# Patient Record
Sex: MALE | Race: WHITE | NOT HISPANIC OR LATINO | Employment: FULL TIME | URBAN - METROPOLITAN AREA
[De-identification: names, ages, dates, MRNs, and addresses within clinical notes are randomized per-mention and may not be internally consistent; named-entity substitution may affect disease eponyms.]

---

## 2021-04-07 ENCOUNTER — TRANSFERRED RECORDS (OUTPATIENT)
Dept: HEALTH INFORMATION MANAGEMENT | Facility: CLINIC | Age: 66
End: 2021-04-07

## 2021-04-07 ENCOUNTER — TRANSCRIBE ORDERS (OUTPATIENT)
Dept: OTHER | Age: 66
End: 2021-04-07

## 2021-04-07 ENCOUNTER — MEDICAL CORRESPONDENCE (OUTPATIENT)
Dept: HEALTH INFORMATION MANAGEMENT | Facility: CLINIC | Age: 66
End: 2021-04-07

## 2021-04-07 DIAGNOSIS — N40.1 BENIGN PROSTATIC HYPERPLASIA WITH LOWER URINARY TRACT SYMPTOMS: Primary | ICD-10-CM

## 2021-04-08 NOTE — TELEPHONE ENCOUNTER
Action Luz 4/8/2021    Action Taken [facility] faxed to [facility] to obtain recs  FAX: [phone]     Action Luz 4/15/2021   Action Taken [place] recs scanned in epic to review

## 2021-08-03 ENCOUNTER — VIRTUAL VISIT (OUTPATIENT)
Dept: UROLOGY | Facility: CLINIC | Age: 66
End: 2021-08-03
Attending: UROLOGY
Payer: COMMERCIAL

## 2021-08-03 ENCOUNTER — PRE VISIT (OUTPATIENT)
Dept: UROLOGY | Facility: CLINIC | Age: 66
End: 2021-08-03

## 2021-08-03 DIAGNOSIS — Z53.9 NO SHOW: Primary | ICD-10-CM

## 2021-08-03 PROCEDURE — 99207 PR NO BILLABLE SERVICE THIS VISIT: CPT | Performed by: UROLOGY

## 2021-08-03 NOTE — PROGRESS NOTES
This patient was a no show for this scheduled appointment.  Multiple attempts made to reach patient, links sent to join video appointment, checked on virtual waiting room for patient.     - Shannon HONG, EMT  Urology Clinic    This patient was a no show for this scheduled appointment.

## 2021-08-03 NOTE — LETTER
8/3/2021       RE: Billy Velásquez  [address] [facility]  [place] [address]     Dear Colleague,    Thank you for referring your patient, Billy Velásquez, to the [facility] UROLOGY CLINIC [place] at [facility]. Please see a copy of my visit note below.      This patient was a no show for this scheduled appointment.  Multiple attempts made to reach patient, links sent to join video appointment, checked on virtual waiting room for patient.     - Shannon HONG, TULIO  Urology Clinic    This patient was a no show for this scheduled appointment.      Again, thank you for allowing me to participate in the care of your patient.      Sincerely,    Francisco Javier Jhaveri MD

## 2021-08-03 NOTE — LETTER
Date:August 6, 2021      Provider requested that no letter be sent. Do not send.       [facility]

## 2021-10-06 NOTE — TELEPHONE ENCOUNTER
MEDICAL RECORDS REQUEST   [facility]  Urology Clinic  [address] [place] [address]  PHONE: [phone]  Fax: [phone]        FUTURE VISIT INFORMATION                                                   Billy Velásquez : 1955 scheduled for future visit at [facility]    APPOINTMENT INFORMATION:    Date: 2021    Provider:  Francisco Javier Jhaveri MD    Reason for Visit/Diagnosis: BPH - discuss Holep - per pt - video verified - text link to [phone] // RECS COMPLETED AY - rescheduled from 8/3/21    REFERRAL INFORMATION:    Referring provider:  Dr. Gary Goldberg    Specialty: Urology     Referring providers clinic:  [facility] contact number:  [phone]    RECORDS REQUESTED FOR VISIT                                                          Action Luz 2021    Action Taken [facility] faxed to [facility] to obtain recs  FAX: [phone]      Action Luz 4/15/2021   Action Taken [place] recs scanned in epic to review

## 2021-11-12 ENCOUNTER — PRE VISIT (OUTPATIENT)
Dept: UROLOGY | Facility: CLINIC | Age: 66
End: 2021-11-12
Payer: COMMERCIAL

## 2021-11-16 ENCOUNTER — PRE VISIT (OUTPATIENT)
Dept: UROLOGY | Facility: CLINIC | Age: 66
End: 2021-11-16

## 2021-11-16 ENCOUNTER — VIRTUAL VISIT (OUTPATIENT)
Dept: UROLOGY | Facility: CLINIC | Age: 66
End: 2021-11-16
Payer: COMMERCIAL

## 2021-11-16 ENCOUNTER — PATIENT OUTREACH (OUTPATIENT)
Dept: UROLOGY | Facility: CLINIC | Age: 66
End: 2021-11-16

## 2021-11-16 VITALS — HEIGHT: 70 IN | BODY MASS INDEX: 28.63 KG/M2 | WEIGHT: 200 LBS

## 2021-11-16 DIAGNOSIS — N40.0 ENLARGED PROSTATE: Primary | ICD-10-CM

## 2021-11-16 PROCEDURE — 99204 OFFICE O/P NEW MOD 45 MIN: CPT | Mod: 95 | Performed by: UROLOGY

## 2021-11-16 RX ORDER — OMEGA-3S/DHA/EPA/FISH OIL 300-1000MG
CAPSULE ORAL
COMMUNITY

## 2021-11-16 RX ORDER — MULTIVIT WITH MINERALS/LUTEIN
1 TABLET ORAL DAILY
COMMUNITY

## 2021-11-16 RX ORDER — LOSARTAN POTASSIUM 50 MG/1
50 TABLET ORAL DAILY
COMMUNITY
Start: 2020-10-24

## 2021-11-16 RX ORDER — NICOTINE POLACRILEX 4 MG/1
20 GUM, CHEWING ORAL DAILY
COMMUNITY

## 2021-11-16 RX ORDER — PREDNISONE 10 MG/1
TABLET ORAL
COMMUNITY
Start: 2021-02-16

## 2021-11-16 RX ORDER — LORATADINE 10 MG/1
10 TABLET, ORALLY DISINTEGRATING ORAL DAILY
COMMUNITY

## 2021-11-16 RX ORDER — METOPROLOL SUCCINATE 50 MG/1
50 TABLET, EXTENDED RELEASE ORAL DAILY
COMMUNITY
Start: 2021-02-22

## 2021-11-16 RX ORDER — ROSUVASTATIN CALCIUM 40 MG/1
40 TABLET, COATED ORAL DAILY
COMMUNITY
Start: 2020-12-26

## 2021-11-16 RX ORDER — HYDROCHLOROTHIAZIDE 25 MG/1
25 TABLET ORAL
COMMUNITY
Start: 2020-11-28

## 2021-11-16 ASSESSMENT — MIFFLIN-ST. JEOR: SCORE: 1703.44

## 2021-11-16 ASSESSMENT — PAIN SCALES - GENERAL: PAINLEVEL: NO PAIN (0)

## 2021-11-16 NOTE — PROGRESS NOTES
Billy is a 66 year old who is being evaluated via a billable video visit.            UROLOGY VIRTUAL VISIT      Chief Complaint:   Enlarged Prostate      Synopsis    Billy Velásquez is a very pleasant 67 yo M with a history of LUTS/BPH for several years.  He has been on flomax 1-2 years without substantial symptom improvement.  He has been seen by a local urologist who is referring him for surgical consideration (Dr. Nick Bansal).  His main symptom is slow stream with hesitancy and incomplete emptying.      He does have a history of cardiac stents and takes aspirin daily for this.      He denies a family history of prostate cancer.      He is interested in definitive intervention to improve symptoms and get him off of medications.      His prostate size has been estiamted at 105 gm    He denies a history of UTI or prostatitis    A cystoscopy was performed, the results of which I personally reviewed and is notable for 2-3+ trabeculation and assymetric growth L>R resulting in high grade obstruction.    A PVR was checked and was 150 ml.           Assessment/Plan   66 year old male with history of large gland BPH  After discussion of medical and surgical treatments for BPH including alpha blockers, anticholinergics, transurethral resection, laser ablation, enucleation and simple prostatectomy, Mr. Velásquez has decided to proceed with Holmium Laser Enucleation of the Prostate (HoLEP).    We discussed the associated risks of this procedure included but not limited to the following:  -Bleeding, potentially significant enough to require clot evacuation and blood transfusion  -Infection, for which we will plan to treat preoperatively based on targeted antibiotic therapy  -Damage to the bladder, urethra and penis including the risk of urethral stricture and bladder neck contracture  -Risk of incidentally discovered prostate cancer.  We discussed that this would not preclude him from further therapy though it could prolong recovery  and potentially increase risk of complications associated with cancer treatment.  Further preoperative workup to assess for prostate cancer prior to surgery was offered but patient deferred.  -Risk of retrograde ejaculation which would be expected to occur in the majority if not all men after HoLEP  -Risk of urinary incontinence.  We discussed that in the majority of men this is a transient process that is generally self limited to the first 6-12 weeks after surgery though could take longer to resolve depending on baseline bladder instability.  -Risk of postperative urinary retention though in published series HoLEP has been found to be associated with high success rates of achieving spontaneous voiding even in men with underactive and atonic bladders.  -We will proceed with preoperative clearance with preference to minimize all anticoagulation as deemed acceptable by his primary care provider.     Complex Medical Decision Making: Yes.  History of Myocardial Infarction  Discussed with patient the higher potential risk of perioperative and post-procedural bleeding as a result of increased bleeding risk due to comorbidities and/or anticoagulant use and   Discussed with patient the higher potential risk of perioperative and post-procedural complications as a result of comorbid illnesses          Physical Exam:   General Appearance: Well groomed, hygenic  Eyes: No redness, discharge  Respiratory: No cough, no respiratory distress or labored breathing  Musculoskeletal:  grossly normal, full range of motion in upper extremities, no gross deficits  Skin: No discoloration or apparent rashes  Neurologic - No tremors  Psychiatric - Alert and oriented  The rest of a comprehensive physical examination is deferred due to public health emergency video visit restrictions      The following \ distinct labs were reviewed    I personally reviewed all applicable laboratory data and went over findings with patient  Significant  for:    PSA 5.6 - 3/21  Creatinine - 1/12 (12/18)      How would you like to obtain your AVS? Mail a copy  If the video visit is dropped, the invitation should be resent by: Text to cell phone: [phone]  Will anyone else be joining your video visit? No      Video Start Time: 8:05  Video-Visit Details    Type of service:  Video Visit    Video End Time:8:30 AM    Originating Location (pt. Location): Home    Distant Location (provider location):  [facility] UROLOGY [facility]     Platform used for Video Visit: [email or web address]

## 2021-11-16 NOTE — TELEPHONE ENCOUNTER
Pt phoned, address verified, [facility] info sent. Pt would like some time to review materials before making decision. Pt given care coordinator phone number at [phone]. Information mailed.

## 2021-11-16 NOTE — LETTER
11/16/2021       RE: Billy Velásquez  [address]   [place] [address]     Dear Colleague,    Thank you for referring your patient, Billy Velásquez, to the [facility] UROLOGY CLINIC [place] at [facility]. Please see a copy of my visit note below.    Billy is a 66 year old who is being evaluated via a billable video visit.            UROLOGY VIRTUAL VISIT      Chief Complaint:   Enlarged Prostate      Synopsis    Billy Velásquez is a very pleasant 67 yo M with a history of LUTS/BPH for several years.  He has been on flomax 1-2 years without substantial symptom improvement.  He has been seen by a local urologist who is referring him for surgical consideration (Dr. Nick Bansal).  His main symptom is slow stream with hesitancy and incomplete emptying.      He does have a history of cardiac stents and takes aspirin daily for this.      He denies a family history of prostate cancer.      He is interested in definitive intervention to improve symptoms and get him off of medications.      His prostate size has been estiamted at 105 gm    He denies a history of UTI or prostatitis    A cystoscopy was performed, the results of which I personally reviewed and is notable for 2-3+ trabeculation and assymetric growth L>R resulting in high grade obstruction.    A PVR was checked and was 150 ml.           Assessment/Plan   66 year old male with history of large gland BPH  After discussion of medical and surgical treatments for BPH including alpha blockers, anticholinergics, transurethral resection, laser ablation, enucleation and simple prostatectomy, Mr. Velásquez has decided to proceed with Holmium Laser Enucleation of the Prostate (HoLEP).    We discussed the associated risks of this procedure included but not limited to the following:  -Bleeding, potentially significant enough to require clot evacuation and blood transfusion  -Infection, for which we will plan to treat preoperatively  based on targeted antibiotic therapy  -Damage to the bladder, urethra and penis including the risk of urethral stricture and bladder neck contracture  -Risk of incidentally discovered prostate cancer.  We discussed that this would not preclude him from further therapy though it could prolong recovery and potentially increase risk of complications associated with cancer treatment.  Further preoperative workup to assess for prostate cancer prior to surgery was offered but patient deferred.  -Risk of retrograde ejaculation which would be expected to occur in the majority if not all men after HoLEP  -Risk of urinary incontinence.  We discussed that in the majority of men this is a transient process that is generally self limited to the first 6-12 weeks after surgery though could take longer to resolve depending on baseline bladder instability.  -Risk of postperative urinary retention though in published series HoLEP has been found to be associated with high success rates of achieving spontaneous voiding even in men with underactive and atonic bladders.  -We will proceed with preoperative clearance with preference to minimize all anticoagulation as deemed acceptable by his primary care provider.     Complex Medical Decision Making: Yes.  History of Myocardial Infarction  Discussed with patient the higher potential risk of perioperative and post-procedural bleeding as a result of increased bleeding risk due to comorbidities and/or anticoagulant use and   Discussed with patient the higher potential risk of perioperative and post-procedural complications as a result of comorbid illnesses          Physical Exam:   General Appearance: Well groomed, hygenic  Eyes: No redness, discharge  Respiratory: No cough, no respiratory distress or labored breathing  Musculoskeletal:  grossly normal, full range of motion in upper extremities, no gross deficits  Skin: No discoloration or apparent rashes  Neurologic - No tremors  Psychiatric  - Alert and oriented  The rest of a comprehensive physical examination is deferred due to public health emergency video visit restrictions      The following \ distinct labs were reviewed    I personally reviewed all applicable laboratory data and went over findings with patient  Significant for:    PSA 5.6 - 3/21  Creatinine - 1/12 (12/18)      How would you like to obtain your AVS? Mail a copy  If the video visit is dropped, the invitation should be resent by: Text to cell phone: [phone]  Will anyone else be joining your video visit? No      Video Start Time: 8:05  Video-Visit Details    Type of service:  Video Visit    Video End Time:8:30 AM    Originating Location (pt. Location): Home    Distant Location (provider location):  [facility] UROLOGY CLINIC [place]     Platform used for Video Visit: [employer]      Again, thank you for allowing me to participate in the care of your patient.      Sincerely,    Francisco Javier Jhaveri MD

## 2021-11-23 ENCOUNTER — TELEPHONE (OUTPATIENT)
Dept: UROLOGY | Facility: CLINIC | Age: 66
End: 2021-11-23
Payer: COMMERCIAL

## 2021-11-24 NOTE — CONFIDENTIAL NOTE
Called patient and spoke to him and let him know that I am working on finding a date in the OR and that I will call him once I have a date available. Patient has my phone number if he has any questions or concerns. Patient acknowledged and agreed with plan.

## 2021-12-28 NOTE — CONFIDENTIAL NOTE
Patient is scheduled for surgery with Dr. Patricia    Spoke with: Patient     Date of Surgery: Friday 02/04/22    Location: [place] OR     Informed patient they will need an adult  Yes    Pre op with Provider jocy    H&P: Scheduled with Patient will schedule with his PCP     Pre-procedure COVID-19 Test: Per patient he tested positive for symptomatic covid via an at home test on 12/01/21 but he did not get it confirmed with a PCR test. Patient states that he is going to call his PMD now to see if he can get a PCR test to see if it will still show up positive. If test shows up negative he will also get another PCR test 4 days prior to surgery close home.    Additional imaging/appointments: jocy    Surgery packet: mailed to patient 12/31/21, verified address on file is current and correct.     Additional comments na

## 2021-12-31 ENCOUNTER — PATIENT OUTREACH (OUTPATIENT)
Dept: UROLOGY | Facility: CLINIC | Age: 66
End: 2021-12-31
Payer: COMMERCIAL

## 2021-12-31 NOTE — TELEPHONE ENCOUNTER
Pt phoned, he would like to set up pelvic floor physical therapy at the [facility]. Medical center phoned and transferred to physical therapy office. Urology nurse left detailed voicemail requesting a call back with instructions of where orders/referral may be faxed to, and if pt should call to schedule or if their staffing would reach out to patient. Will wait to hear back from team.    [facility] calls back. They do not work with male incontinence, neither does [facility]. [facility] system would be closest.     [facility] are closed until Monday, pt states he would like to go to either their [facility] at [phone] or [facility] at [phone], with an update after referral is placed. Nurse will touch base after holiday, pt okay with this plan.

## 2022-01-03 ENCOUNTER — PATIENT OUTREACH (OUTPATIENT)
Dept: UROLOGY | Facility: CLINIC | Age: 67
End: 2022-01-03
Payer: COMMERCIAL

## 2022-01-03 DIAGNOSIS — R32 URINARY INCONTINENCE: Primary | ICD-10-CM

## 2022-01-03 NOTE — TELEPHONE ENCOUNTER
Pt phoned and detailed voicemail left. [facility] will reach out to schedule once referral fax has been received and put into their system. Fax can go to [phone]

## 2022-01-04 NOTE — TELEPHONE ENCOUNTER
Pt phones back into clinic, there was a misunderstanding on nursing end. Pt health insurance does not cover [facility], just [facility]. [facility] phoned, fax number is [phone]    Order refaxed to [facility]. Pt will wait for a call to schedule PT.

## 2022-01-14 ENCOUNTER — PATIENT OUTREACH (OUTPATIENT)
Dept: UROLOGY | Facility: CLINIC | Age: 67
End: 2022-01-14
Payer: COMMERCIAL

## 2022-01-14 NOTE — TELEPHONE ENCOUNTER
Pt called to review surgery teaching, no answer. Detailed voicemail left stating nurse will attempt again at a later date

## 2022-01-18 ENCOUNTER — PATIENT OUTREACH (OUTPATIENT)
Dept: UROLOGY | Facility: CLINIC | Age: 67
End: 2022-01-18
Payer: COMMERCIAL

## 2022-01-18 NOTE — TELEPHONE ENCOUNTER
Pre Op Teaching Flowsheet       Pre and Post op Patient Education  Relevant Diagnosis:  bph  Surgical procedure:  holep  Teaching Topic:  Pre and post op teaching  Person Involved in teaching: Yes    Motivation Level:  Asks Questions: Yes  Eager to Learn: Yes  Cooperative: Yes  Receptive (willing/able to accept information):  Yes    Patient demonstrates understanding of the following:  Date of surgery:  2/4  Location of surgery:  3rd Floor[facility]  History and Physical and any other testing necessary prior to surgery: Yes  Required time line for completion of History and Physical and any pre-op testing: Yes    Patient demonstrates understanding of the following:  Pre-op bowel prep:  N/A  Pre-op showering/scrub information with PCMX Soap: Yes  Blood thinner medications discussed and when to stop (if applicable):  Pt takes 81mg aspirin, he will reach out to his cardiologist about when to discontinue. Also he has a pre op visit he will schedule and will discuss this point at that visit also  Discussed no visitor's at this time due to increase Covid-19 cases and how we need to make sure everyone stays safe.    Infection Prevention:   Patient demonstrates understanding of the following:  Surgical procedure site care taught: Yes  Signs and symptoms of infection taught: Yes      Post-op follow-up:  Discussed how to contact the hospital, nurse, and clinic scheduling staff if necessary. (See packet information)    Instructional materials used/given/mailed:  [place] Surgery Packet, post op teaching sheet, Map, Soap, and with the arrival/location information to come closer to the surgery date.    Surgical instructions packet given to patient in office:  N/A    Follow up: Discussed arranging for someone to drive you home. ( No public transportation)  Someone needed to stay the first twenty hours after surgery: Yes     referral: none     home:  yes    Care Giver:  yes    PCP:  yes

## 2022-01-25 ENCOUNTER — TRANSFERRED RECORDS (OUTPATIENT)
Dept: HEALTH INFORMATION MANAGEMENT | Facility: CLINIC | Age: 67
End: 2022-01-25
Payer: COMMERCIAL

## 2022-01-26 DIAGNOSIS — Z11.59 ENCOUNTER FOR SCREENING FOR OTHER VIRAL DISEASES: Primary | ICD-10-CM

## 2022-01-27 ENCOUNTER — PATIENT OUTREACH (OUTPATIENT)
Dept: UROLOGY | Facility: CLINIC | Age: 67
End: 2022-01-27
Payer: COMMERCIAL

## 2022-01-27 ENCOUNTER — TRANSFERRED RECORDS (OUTPATIENT)
Dept: HEALTH INFORMATION MANAGEMENT | Facility: CLINIC | Age: 67
End: 2022-01-27
Payer: COMMERCIAL

## 2022-01-27 NOTE — TELEPHONE ENCOUNTER
Pt wishes for [facility] paperwork to allow him 3 weeks off work for recovery- He will fax this into clinic tomorrow and wishes for a confirmation call when received.

## 2022-01-28 ENCOUNTER — PATIENT OUTREACH (OUTPATIENT)
Dept: UROLOGY | Facility: CLINIC | Age: 67
End: 2022-01-28
Payer: COMMERCIAL

## 2022-02-01 RX ORDER — UBIDECARENONE 200 MG
200 CAPSULE ORAL DAILY
COMMUNITY

## 2022-02-01 RX ORDER — TAMSULOSIN HYDROCHLORIDE 0.4 MG/1
0.4 CAPSULE ORAL DAILY
COMMUNITY

## 2022-02-01 RX ORDER — NITROGLYCERIN 0.4 MG/1
0.4 TABLET SUBLINGUAL EVERY 5 MIN PRN
COMMUNITY

## 2022-02-01 RX ORDER — AMLODIPINE BESYLATE 5 MG/1
5 TABLET ORAL DAILY
COMMUNITY

## 2022-02-01 RX ORDER — PHENOL 1.4 %
10 AEROSOL, SPRAY (ML) MUCOUS MEMBRANE
COMMUNITY

## 2022-02-03 ENCOUNTER — TELEPHONE (OUTPATIENT)
Dept: UROLOGY | Facility: CLINIC | Age: 67
End: 2022-02-03
Payer: COMMERCIAL

## 2022-02-03 NOTE — TELEPHONE ENCOUNTER
Action 02/01/22 MMT   Action Taken  [employer] faxed signed FMLA forms to patient's employer per pt request.

## 2022-02-03 NOTE — TELEPHONE ENCOUNTER
----- Message from Jewell Harrison RN sent at 1/28/2022  9:05 AM CST -----  Regarding: RE: fax coming in tonight  No that's perfect, would you put him down for 3 weeks off work and I will let him know forms were received? He was anxious about faxing things correctly    Thank you!  ----- Message -----  From: Anneliese Mcconnell  Sent: 1/28/2022   9:04 AM CST  To: Jewell Harrison RN  Subject: RE: fax coming in tonight                        I received FMLA forms for him. Is that what was supposed to be sent or were you waiting on results?  ----- Message -----  From: Jewell Harrison RN  Sent: 1/27/2022   3:29 PM CST  To: Jewell Harrison RN, Anneliese Mcconnell  Subject: fax coming in tonight                            Hey! Would you keep your eye on the fax for me?  This patient should have it coming in by tomorrow morning    Thank you!  Jewell

## 2022-02-04 ENCOUNTER — ANESTHESIA (OUTPATIENT)
Dept: SURGERY | Facility: CLINIC | Age: 67
End: 2022-02-04
Payer: COMMERCIAL

## 2022-02-04 ENCOUNTER — ANESTHESIA EVENT (OUTPATIENT)
Dept: SURGERY | Facility: CLINIC | Age: 67
End: 2022-02-04
Payer: COMMERCIAL

## 2022-02-04 ENCOUNTER — HOSPITAL ENCOUNTER (OUTPATIENT)
Facility: CLINIC | Age: 67
Discharge: HOME | End: 2022-02-05
Attending: UROLOGY | Admitting: UROLOGY
Payer: COMMERCIAL

## 2022-02-04 DIAGNOSIS — N40.0 ENLARGED PROSTATE: Primary | ICD-10-CM

## 2022-02-04 PROBLEM — T88.4XXA HARD TO INTUBATE: Status: ACTIVE | Noted: 2022-02-04

## 2022-02-04 LAB
ABO/RH(D): NORMAL
ANTIBODY SCREEN: NEGATIVE
GLUCOSE BLDC GLUCOMTR-MCNC: 117 MG/DL (ref 70–99)
POTASSIUM BLD-SCNC: 3.9 MMOL/L (ref 3.4–5.3)
SPECIMEN EXPIRATION DATE: NORMAL

## 2022-02-04 PROCEDURE — 250N000009 HC RX 250: Performed by: UROLOGY

## 2022-02-04 PROCEDURE — 86850 RBC ANTIBODY SCREEN: CPT | Performed by: ANESTHESIOLOGY

## 2022-02-04 PROCEDURE — 360N000077 HC SURGERY LEVEL 4, PER MIN: Performed by: UROLOGY

## 2022-02-04 PROCEDURE — 370N000017 HC ANESTHESIA TECHNICAL FEE, PER MIN: Performed by: UROLOGY

## 2022-02-04 PROCEDURE — 52649 PROSTATE LASER ENUCLEATION: CPT | Performed by: UROLOGY

## 2022-02-04 PROCEDURE — 88305 TISSUE EXAM BY PATHOLOGIST: CPT | Mod: 26 | Performed by: PATHOLOGY

## 2022-02-04 PROCEDURE — 250N000011 HC RX IP 250 OP 636: Performed by: UROLOGY

## 2022-02-04 PROCEDURE — 250N000011 HC RX IP 250 OP 636: Performed by: NURSE ANESTHETIST, CERTIFIED REGISTERED

## 2022-02-04 PROCEDURE — 250N000024 HC ISOFLURANE, PER MIN: Performed by: UROLOGY

## 2022-02-04 PROCEDURE — 999N000141 HC STATISTIC PRE-PROCEDURE NURSING ASSESSMENT: Performed by: UROLOGY

## 2022-02-04 PROCEDURE — 710N000010 HC RECOVERY PHASE 1, LEVEL 2, PER MIN: Performed by: UROLOGY

## 2022-02-04 PROCEDURE — C1758 CATHETER, URETERAL: HCPCS | Performed by: UROLOGY

## 2022-02-04 PROCEDURE — 250N000009 HC RX 250: Performed by: NURSE ANESTHETIST, CERTIFIED REGISTERED

## 2022-02-04 PROCEDURE — 88305 TISSUE EXAM BY PATHOLOGIST: CPT | Mod: TC | Performed by: UROLOGY

## 2022-02-04 PROCEDURE — 258N000003 HC RX IP 258 OP 636

## 2022-02-04 PROCEDURE — 272N000001 HC OR GENERAL SUPPLY STERILE: Performed by: UROLOGY

## 2022-02-04 PROCEDURE — 36415 COLL VENOUS BLD VENIPUNCTURE: CPT | Performed by: ANESTHESIOLOGY

## 2022-02-04 PROCEDURE — 250N000011 HC RX IP 250 OP 636: Performed by: ANESTHESIOLOGY

## 2022-02-04 PROCEDURE — 82962 GLUCOSE BLOOD TEST: CPT

## 2022-02-04 PROCEDURE — 710N000012 HC RECOVERY PHASE 2, PER MINUTE: Performed by: UROLOGY

## 2022-02-04 PROCEDURE — 86901 BLOOD TYPING SEROLOGIC RH(D): CPT | Performed by: ANESTHESIOLOGY

## 2022-02-04 PROCEDURE — 250N000013 HC RX MED GY IP 250 OP 250 PS 637: Performed by: ANESTHESIOLOGY

## 2022-02-04 PROCEDURE — 84132 ASSAY OF SERUM POTASSIUM: CPT | Performed by: ANESTHESIOLOGY

## 2022-02-04 PROCEDURE — 258N000003 HC RX IP 258 OP 636: Performed by: ANESTHESIOLOGY

## 2022-02-04 RX ORDER — NITROFURANTOIN 25; 75 MG/1; MG/1
100 CAPSULE ORAL 2 TIMES DAILY
Qty: 14 CAPSULE | Refills: 0 | Status: SHIPPED | OUTPATIENT
Start: 2022-02-04

## 2022-02-04 RX ORDER — FENTANYL CITRATE 50 UG/ML
25 INJECTION, SOLUTION INTRAMUSCULAR; INTRAVENOUS
Status: DISCONTINUED | OUTPATIENT
Start: 2022-02-04 | End: 2022-02-04 | Stop reason: HOSPADM

## 2022-02-04 RX ORDER — ACETAMINOPHEN 325 MG/1
650 TABLET ORAL EVERY 4 HOURS PRN
Status: DISCONTINUED | OUTPATIENT
Start: 2022-02-04 | End: 2022-02-05 | Stop reason: HOSPADM

## 2022-02-04 RX ORDER — DEXAMETHASONE SODIUM PHOSPHATE 4 MG/ML
INJECTION, SOLUTION INTRA-ARTICULAR; INTRALESIONAL; INTRAMUSCULAR; INTRAVENOUS; SOFT TISSUE PRN
Status: DISCONTINUED | OUTPATIENT
Start: 2022-02-04 | End: 2022-02-04

## 2022-02-04 RX ORDER — CEFAZOLIN SODIUM 2 G/100ML
2 INJECTION, SOLUTION INTRAVENOUS SEE ADMIN INSTRUCTIONS
Status: DISCONTINUED | OUTPATIENT
Start: 2022-02-04 | End: 2022-02-04 | Stop reason: HOSPADM

## 2022-02-04 RX ORDER — HYDROMORPHONE HCL IN WATER/PF 6 MG/30 ML
0.2 PATIENT CONTROLLED ANALGESIA SYRINGE INTRAVENOUS EVERY 5 MIN PRN
Status: DISCONTINUED | OUTPATIENT
Start: 2022-02-04 | End: 2022-02-04 | Stop reason: HOSPADM

## 2022-02-04 RX ORDER — ATROPA BELLADONNA AND OPIUM 16.2; 3 MG/1; MG/1
SUPPOSITORY RECTAL PRN
Status: DISCONTINUED | OUTPATIENT
Start: 2022-02-04 | End: 2022-02-04 | Stop reason: HOSPADM

## 2022-02-04 RX ORDER — ONDANSETRON 4 MG/1
4 TABLET, ORALLY DISINTEGRATING ORAL EVERY 30 MIN PRN
Status: DISCONTINUED | OUTPATIENT
Start: 2022-02-04 | End: 2022-02-04 | Stop reason: HOSPADM

## 2022-02-04 RX ORDER — SODIUM CHLORIDE, SODIUM LACTATE, POTASSIUM CHLORIDE, CALCIUM CHLORIDE 600; 310; 30; 20 MG/100ML; MG/100ML; MG/100ML; MG/100ML
INJECTION, SOLUTION INTRAVENOUS CONTINUOUS
Status: DISCONTINUED | OUTPATIENT
Start: 2022-02-04 | End: 2022-02-04 | Stop reason: HOSPADM

## 2022-02-04 RX ORDER — LIDOCAINE HYDROCHLORIDE 20 MG/ML
INJECTION, SOLUTION INFILTRATION; PERINEURAL PRN
Status: DISCONTINUED | OUTPATIENT
Start: 2022-02-04 | End: 2022-02-04

## 2022-02-04 RX ORDER — AMLODIPINE BESYLATE 5 MG/1
5 TABLET ORAL DAILY
Status: DISCONTINUED | OUTPATIENT
Start: 2022-02-05 | End: 2022-02-05 | Stop reason: HOSPADM

## 2022-02-04 RX ORDER — HYDROCHLOROTHIAZIDE 25 MG/1
25 TABLET ORAL DAILY
Status: DISCONTINUED | OUTPATIENT
Start: 2022-02-05 | End: 2022-02-05 | Stop reason: HOSPADM

## 2022-02-04 RX ORDER — PROPOFOL 10 MG/ML
INJECTION, EMULSION INTRAVENOUS PRN
Status: DISCONTINUED | OUTPATIENT
Start: 2022-02-04 | End: 2022-02-04

## 2022-02-04 RX ORDER — LOSARTAN POTASSIUM 50 MG/1
50 TABLET ORAL DAILY
Status: DISCONTINUED | OUTPATIENT
Start: 2022-02-05 | End: 2022-02-05 | Stop reason: HOSPADM

## 2022-02-04 RX ORDER — NALOXONE HYDROCHLORIDE 0.4 MG/ML
0.4 INJECTION, SOLUTION INTRAMUSCULAR; INTRAVENOUS; SUBCUTANEOUS
Status: DISCONTINUED | OUTPATIENT
Start: 2022-02-04 | End: 2022-02-05 | Stop reason: HOSPADM

## 2022-02-04 RX ORDER — OXYCODONE HYDROCHLORIDE 5 MG/1
5 TABLET ORAL EVERY 4 HOURS PRN
Status: DISCONTINUED | OUTPATIENT
Start: 2022-02-04 | End: 2022-02-04 | Stop reason: HOSPADM

## 2022-02-04 RX ORDER — EPHEDRINE SULFATE 50 MG/ML
INJECTION, SOLUTION INTRAMUSCULAR; INTRAVENOUS; SUBCUTANEOUS PRN
Status: DISCONTINUED | OUTPATIENT
Start: 2022-02-04 | End: 2022-02-04

## 2022-02-04 RX ORDER — ONDANSETRON 2 MG/ML
4 INJECTION INTRAMUSCULAR; INTRAVENOUS EVERY 30 MIN PRN
Status: DISCONTINUED | OUTPATIENT
Start: 2022-02-04 | End: 2022-02-04 | Stop reason: HOSPADM

## 2022-02-04 RX ORDER — POLYETHYLENE GLYCOL 3350 17 G/17G
17 POWDER, FOR SOLUTION ORAL DAILY
Status: DISCONTINUED | OUTPATIENT
Start: 2022-02-05 | End: 2022-02-05 | Stop reason: HOSPADM

## 2022-02-04 RX ORDER — SODIUM CHLORIDE, SODIUM LACTATE, POTASSIUM CHLORIDE, CALCIUM CHLORIDE 600; 310; 30; 20 MG/100ML; MG/100ML; MG/100ML; MG/100ML
INJECTION, SOLUTION INTRAVENOUS CONTINUOUS
Status: DISCONTINUED | OUTPATIENT
Start: 2022-02-04 | End: 2022-02-05 | Stop reason: HOSPADM

## 2022-02-04 RX ORDER — ACETAMINOPHEN 325 MG/1
650 TABLET ORAL EVERY 4 HOURS PRN
Qty: 100 TABLET | Refills: 0 | Status: SHIPPED | OUTPATIENT
Start: 2022-02-04

## 2022-02-04 RX ORDER — ROSUVASTATIN CALCIUM 20 MG/1
40 TABLET, COATED ORAL DAILY
Status: DISCONTINUED | OUTPATIENT
Start: 2022-02-05 | End: 2022-02-05 | Stop reason: HOSPADM

## 2022-02-04 RX ORDER — GLYCOPYRROLATE 0.2 MG/ML
INJECTION, SOLUTION INTRAMUSCULAR; INTRAVENOUS PRN
Status: DISCONTINUED | OUTPATIENT
Start: 2022-02-04 | End: 2022-02-04

## 2022-02-04 RX ORDER — NALOXONE HYDROCHLORIDE 0.4 MG/ML
0.2 INJECTION, SOLUTION INTRAMUSCULAR; INTRAVENOUS; SUBCUTANEOUS
Status: DISCONTINUED | OUTPATIENT
Start: 2022-02-04 | End: 2022-02-05 | Stop reason: HOSPADM

## 2022-02-04 RX ORDER — CEFAZOLIN SODIUM 2 G/100ML
2 INJECTION, SOLUTION INTRAVENOUS
Status: COMPLETED | OUTPATIENT
Start: 2022-02-04 | End: 2022-02-04

## 2022-02-04 RX ORDER — PANTOPRAZOLE SODIUM 40 MG/1
40 TABLET, DELAYED RELEASE ORAL DAILY
Status: DISCONTINUED | OUTPATIENT
Start: 2022-02-05 | End: 2022-02-05 | Stop reason: HOSPADM

## 2022-02-04 RX ORDER — LIDOCAINE 40 MG/G
CREAM TOPICAL
Status: DISCONTINUED | OUTPATIENT
Start: 2022-02-04 | End: 2022-02-05 | Stop reason: HOSPADM

## 2022-02-04 RX ORDER — METOPROLOL SUCCINATE 25 MG/1
50 TABLET, EXTENDED RELEASE ORAL DAILY
Status: DISCONTINUED | OUTPATIENT
Start: 2022-02-05 | End: 2022-02-05 | Stop reason: HOSPADM

## 2022-02-04 RX ORDER — ONDANSETRON 2 MG/ML
INJECTION INTRAMUSCULAR; INTRAVENOUS PRN
Status: DISCONTINUED | OUTPATIENT
Start: 2022-02-04 | End: 2022-02-04

## 2022-02-04 RX ORDER — FENTANYL CITRATE 50 UG/ML
25 INJECTION, SOLUTION INTRAMUSCULAR; INTRAVENOUS EVERY 5 MIN PRN
Status: DISCONTINUED | OUTPATIENT
Start: 2022-02-04 | End: 2022-02-04 | Stop reason: HOSPADM

## 2022-02-04 RX ORDER — MEPERIDINE HYDROCHLORIDE 25 MG/ML
12.5 INJECTION INTRAMUSCULAR; INTRAVENOUS; SUBCUTANEOUS
Status: DISCONTINUED | OUTPATIENT
Start: 2022-02-04 | End: 2022-02-04 | Stop reason: HOSPADM

## 2022-02-04 RX ORDER — FENTANYL CITRATE 50 UG/ML
INJECTION, SOLUTION INTRAMUSCULAR; INTRAVENOUS PRN
Status: DISCONTINUED | OUTPATIENT
Start: 2022-02-04 | End: 2022-02-04

## 2022-02-04 RX ORDER — SODIUM CHLORIDE, SODIUM LACTATE, POTASSIUM CHLORIDE, CALCIUM CHLORIDE 600; 310; 30; 20 MG/100ML; MG/100ML; MG/100ML; MG/100ML
INJECTION, SOLUTION INTRAVENOUS
Status: COMPLETED
Start: 2022-02-04 | End: 2022-02-04

## 2022-02-04 RX ORDER — AMOXICILLIN 250 MG
1-2 CAPSULE ORAL 2 TIMES DAILY
Qty: 30 TABLET | Refills: 0 | Status: SHIPPED | OUTPATIENT
Start: 2022-02-04

## 2022-02-04 RX ORDER — OXYCODONE HYDROCHLORIDE 5 MG/1
5-10 TABLET ORAL EVERY 4 HOURS PRN
Status: DISCONTINUED | OUTPATIENT
Start: 2022-02-04 | End: 2022-02-05 | Stop reason: HOSPADM

## 2022-02-04 RX ORDER — AMOXICILLIN 250 MG
1 CAPSULE ORAL
Status: DISCONTINUED | OUTPATIENT
Start: 2022-02-04 | End: 2022-02-05 | Stop reason: HOSPADM

## 2022-02-04 RX ADMIN — SODIUM CHLORIDE, POTASSIUM CHLORIDE, SODIUM LACTATE AND CALCIUM CHLORIDE: 600; 310; 30; 20 INJECTION, SOLUTION INTRAVENOUS at 22:10

## 2022-02-04 RX ADMIN — GLYCOPYRROLATE 0.1 MG: 0.2 INJECTION, SOLUTION INTRAMUSCULAR; INTRAVENOUS at 13:54

## 2022-02-04 RX ADMIN — PROPOFOL 40 MG: 10 INJECTION, EMULSION INTRAVENOUS at 13:40

## 2022-02-04 RX ADMIN — SODIUM CHLORIDE, SODIUM LACTATE, POTASSIUM CHLORIDE, CALCIUM CHLORIDE: 600; 310; 30; 20 INJECTION, SOLUTION INTRAVENOUS at 22:10

## 2022-02-04 RX ADMIN — MIDAZOLAM 2 MG: 1 INJECTION INTRAMUSCULAR; INTRAVENOUS at 12:35

## 2022-02-04 RX ADMIN — CEFAZOLIN 2 G: 10 INJECTION, POWDER, FOR SOLUTION INTRAVENOUS at 12:51

## 2022-02-04 RX ADMIN — FENTANYL CITRATE 25 MCG: 50 INJECTION, SOLUTION INTRAMUSCULAR; INTRAVENOUS at 14:55

## 2022-02-04 RX ADMIN — GLYCOPYRROLATE 0.1 MG: 0.2 INJECTION, SOLUTION INTRAMUSCULAR; INTRAVENOUS at 14:11

## 2022-02-04 RX ADMIN — SODIUM CHLORIDE, POTASSIUM CHLORIDE, SODIUM LACTATE AND CALCIUM CHLORIDE: 600; 310; 30; 20 INJECTION, SOLUTION INTRAVENOUS at 12:35

## 2022-02-04 RX ADMIN — ONDANSETRON 4 MG: 2 INJECTION INTRAMUSCULAR; INTRAVENOUS at 14:23

## 2022-02-04 RX ADMIN — LIDOCAINE HYDROCHLORIDE 100 MG: 20 INJECTION, SOLUTION INFILTRATION; PERINEURAL at 12:38

## 2022-02-04 RX ADMIN — PROPOFOL 30 MG: 10 INJECTION, EMULSION INTRAVENOUS at 14:13

## 2022-02-04 RX ADMIN — PROPOFOL 200 MG: 10 INJECTION, EMULSION INTRAVENOUS at 12:38

## 2022-02-04 RX ADMIN — DEXAMETHASONE SODIUM PHOSPHATE 8 MG: 4 INJECTION, SOLUTION INTRAMUSCULAR; INTRAVENOUS at 12:54

## 2022-02-04 RX ADMIN — ROCURONIUM BROMIDE 40 MG: 50 INJECTION, SOLUTION INTRAVENOUS at 12:38

## 2022-02-04 RX ADMIN — OXYCODONE HYDROCHLORIDE 5 MG: 5 TABLET ORAL at 15:46

## 2022-02-04 RX ADMIN — ROCURONIUM BROMIDE 10 MG: 50 INJECTION, SOLUTION INTRAVENOUS at 13:40

## 2022-02-04 RX ADMIN — FENTANYL CITRATE 100 MCG: 50 INJECTION, SOLUTION INTRAMUSCULAR; INTRAVENOUS at 12:38

## 2022-02-04 RX ADMIN — FENTANYL CITRATE 50 MCG: 50 INJECTION, SOLUTION INTRAMUSCULAR; INTRAVENOUS at 13:13

## 2022-02-04 RX ADMIN — HYDROMORPHONE HYDROCHLORIDE 0.2 MG: 1 INJECTION, SOLUTION INTRAMUSCULAR; INTRAVENOUS; SUBCUTANEOUS at 15:13

## 2022-02-04 RX ADMIN — HYDROMORPHONE HYDROCHLORIDE 0.2 MG: 1 INJECTION, SOLUTION INTRAMUSCULAR; INTRAVENOUS; SUBCUTANEOUS at 15:43

## 2022-02-04 RX ADMIN — FENTANYL CITRATE 25 MCG: 50 INJECTION, SOLUTION INTRAMUSCULAR; INTRAVENOUS at 14:45

## 2022-02-04 RX ADMIN — Medication 10 MG: at 12:57

## 2022-02-04 RX ADMIN — SUGAMMADEX 200 MG: 100 INJECTION, SOLUTION INTRAVENOUS at 14:23

## 2022-02-04 ASSESSMENT — LIFESTYLE VARIABLES: TOBACCO_USE: 1

## 2022-02-04 ASSESSMENT — MIFFLIN-ST. JEOR: SCORE: 1709.25

## 2022-02-04 NOTE — OR NURSING
Trial void with approximately 300 ml irrigation solution.60 ml balloon deflated and catheter discontinued. Patient up to bathroom and voided 100 ml cherry colored urine,bladder scan for 239.

## 2022-02-04 NOTE — ANESTHESIA PREPROCEDURE EVALUATION
Anesthesia Pre-Procedure Evaluation    Patient: Billy Velásquez   MRN: [id number] : 1955        Preoperative Diagnosis: Enlarged prostate [N40.0]    Procedure : Procedure(s):  ENUCLEATION, PROSTATE, USING HOLMIUM LASER          Past Medical History:   Diagnosis Date     Atherosclerotic cardiovascular disease      Gastroesophageal reflux disease      History of coronary angiogram      Hypertension      Sleep apnea       Past Surgical History:   Procedure Laterality Date     COLONOSCOPY       Coronary artery stent placement - DESx4 to RCA (Chronic total occlusion)  2018     HERNIA REPAIR        Allergies   Allergen Reactions     Isosorbide      Other reaction(s): Headache     Lisinopril Cough      Social History     Tobacco Use     Smoking status: Never Smoker     Smokeless tobacco: Never Used   Substance Use Topics     Alcohol use: Never      Wt Readings from Last 1 Encounters:   22 91.3 kg (201 lb 4.5 oz)        Anesthesia Evaluation            ROS/MED HX  ENT/Pulmonary:     (+) sleep apnea, tobacco use, Past use,     Neurologic:  - neg neurologic ROS     Cardiovascular: Comment: Hx/o complete RCA occlusion, DESx4 placed in 2018, on ASA81 only    (+) Dyslipidemia hypertension--CAD --stent-2018. 4 Drug Eluting Stent.     METS/Exercise Tolerance:     Hematologic:  - neg hematologic  ROS     Musculoskeletal:  - neg musculoskeletal ROS     GI/Hepatic:     (+) GERD, Asymptomatic on medication,     Renal/Genitourinary:     (+) BPH,     Endo:  - neg endo ROS     Psychiatric/Substance Use:  - neg psychiatric ROS     Infectious Disease:  - neg infectious disease ROS     Malignancy:  - neg malignancy ROS     Other:            Physical Exam    Airway  airway exam normal      Mallampati: II   TM distance: > 3 FB   Neck ROM: full   Mouth opening: > 3 cm    Respiratory Devices and Support         Dental  no notable dental history         Cardiovascular   cardiovascular exam normal       Rhythm and rate: regular  and normal     Pulmonary   pulmonary exam normal                OUTSIDE LABS:  CBC: No results found for: WBC, HGB, HCT, PLT  BMP:   Lab Results   Component Value Date    POTASSIUM 3.9 02/04/2022     (H) 02/04/2022     COAGS: No results found for: PTT, INR, FIBR  POC: No results found for: BGM, HCG, HCGS  HEPATIC: No results found for: ALBUMIN, PROTTOTAL, ALT, AST, GGT, ALKPHOS, BILITOTAL, BILIDIRECT, FIDE  OTHER: No results found for: PH, LACT, A1C, OSVALDO, PHOS, MAG, LIPASE, AMYLASE, TSH, T4, T3, CRP, SED    Anesthesia Plan    ASA Status:  3   NPO Status:  NPO Appropriate    Anesthesia Type: General.     - Airway: ETT   Induction: Intravenous, Propofol.   Maintenance: Balanced.        Consents    Anesthesia Plan(s) and associated risks, benefits, and realistic alternatives discussed. Questions answered and patient/representative(s) expressed understanding.    - Discussed:     - Discussed with:  Patient      - Extended Intubation/Ventilatory Support Discussed: No.      - Patient is DNR/DNI Status: No    Use of blood products discussed: Yes.     - Discussed with: Patient.     Postoperative Care    Pain management: IV analgesics, Oral pain medications.   PONV prophylaxis: Ondansetron (or other 5HT-3), Dexamethasone or Solumedrol     Comments:    Other Comments: GETA, Standard ASA monitoring  All available and pertinent medical records and test results reviewed.  Risks, including but not limited to airway injury, bronchospasm,  hypoxemia, PONV, need for blood transfusion d/w patient            Austin Fuller MD

## 2022-02-04 NOTE — DISCHARGE INSTRUCTIONS
Same-Day Surgery   Adult Discharge Orders & Instructions     For 24 hours after surgery:  1. Get plenty of rest.  A responsible adult must stay with you for at least 24 hours after you leave the hospital.   2. Pain medication can slow your reflexes. Do not drive or use heavy equipment.  If you have weakness or tingling, don't drive or use heavy equipment until this feeling goes away.  3. Mixing alcohol and pain medication can cause dizziness and slow your breathing. It can even be fatal. Do not drink alcohol while taking pain medication.  4. Avoid strenuous or risky activities.  Ask for help when climbing stairs.   5. You may feel lightheaded.  If so, sit for a few minutes before standing.  Have someone help you get up.   6. If you have nausea (feel sick to your stomach), drink only clear liquids such as apple juice, ginger ale, broth or 7-Up.  Rest may also help.  Be sure to drink enough fluids.  Move to a regular diet as you feel able. Take pain medications with a small amount of solid food, such as toast or crackers, to avoid nausea.   7. A slight fever is normal. Call the doctor if your fever is over 100 F (37.7 C) (taken under the tongue) or lasts longer than 24 hours.  8. You may have a dry mouth, muscle aches, trouble sleeping or a sore throat.  These symptoms should go away after 24 hours.  9. Do not make important or legal decisions.   Pain Management:      1. Take pain medication (if prescribed) for pain as directed by your physician.        2. WARNING: If the pain medication you have been prescribed contains Tylenol  (acetaminophen), DO NOT take additional doses of Tylenol (acetaminophen).     Call your doctor for any of the followin.  Signs of infection (fever, growing tenderness at the surgery site, severe pain, a large amount of drainage or bleeding, foul-smelling drainage, redness, swelling).    2.  It has been over 8 to 10 hours since surgery and you are still not able to urinate (pee).    3.   Headache for over 24 hours.    4.  Numbness, tingling or weakness the day after surgery (if you had spinal anesthesia).  To contact a doctor, call _____________________________________ or:      [phone] and ask for the Resident On Call for:          __________________________________________ (answered 24 hours a day)      Emergency Department:  [facility]: [phone]  [facility]: [phone]               Rev. 10/2014

## 2022-02-04 NOTE — ANESTHESIA POSTPROCEDURE EVALUATION
Patient: Billy Velásquez    Procedure: Procedure(s):  ENUCLEATION, PROSTATE, USING HOLMIUM LASER       Diagnosis:Enlarged prostate [N40.0]  Diagnosis Additional Information: No value filed.    Anesthesia Type:  General    Note:  Disposition: Outpatient   Postop Pain Control: Uneventful            Sign Out: Well controlled pain   PONV: No   Neuro/Psych: Uneventful            Sign Out: Acceptable/Baseline neuro status   Airway/Respiratory:             Events: Difficult intubation            Sign Out: Acceptable/Baseline resp. status   CV/Hemodynamics: Uneventful            Sign Out: Acceptable CV status; No obvious hypovolemia; No obvious fluid overload   Other NRE:    DID A NON-ROUTINE EVENT OCCUR? YES    Event details/Postop Comments:  Smooth intravenous induction and bag/mask ventilation with Size 10 oropharyngeal airway. Initial laryngoscopy attempt with Aquino #2 blade by CRNA showed Grade 4 Cormack and Lehane view. Bag/mask ventilation resumed and the second direct laryngoscopy was performed by the attending anesthesiologist with MAC 4 blade. This time the epiglottis was clearly visible, and externally positioning the glottis upward, right and posterior the posterior aspect or the glottis (Grade 3 CL view), including a small portion of the glottic opening was brought into view. This view was maintained by the attending anesthesiologist and the CRNA introduced the bougie without difficulties. The ETT had to be turned 180 degrees (bevel down) to advance it into the trachea. Sustained EtCO2 on capnography, breath sounds are bilateral and equal. Minimal left upper lip lesion was noted.The patient's  oxygenation was in physiological range during the entire airway manipulation.  Discussed with patient, difficult intubation is indicated in patient's record and letter with event description was provided to the patient.             Last vitals:  Vitals Value Taken Time   /79 02/04/22 1545   Temp 36.2  C (97.2  F)  02/04/22 1435   Pulse 68 02/04/22 1556   Resp 11 02/04/22 1557   SpO2 94 % 02/04/22 1558   Vitals shown include unvalidated device data.    Electronically Signed By: Austin Fuller MD  February 4, 2022  4:12 PM

## 2022-02-04 NOTE — ANESTHESIA CARE TRANSFER NOTE
Patient: Billy Velásquez    Procedure: Procedure(s):  ENUCLEATION, PROSTATE, USING HOLMIUM LASER       Diagnosis: Enlarged prostate [N40.0]  Diagnosis Additional Information: No value filed.    Anesthesia Type:   General     Note:    Oropharynx: oropharynx clear of all foreign objects  Level of Consciousness: awake  Oxygen Supplementation: face mask    Independent Airway: airway patency satisfactory and stable  Dentition: dentition unchanged  Vital Signs Stable: post-procedure vital signs reviewed and stable  Report to RN Given: handoff report given  Patient transferred to: PACU    Handoff Report: Identifed the Patient, Identified the Reponsible Provider, Reviewed the pertinent medical history, Discussed the surgical course, Reviewed Intra-OP anesthesia mangement and issues during anesthesia, Set expectations for post-procedure period and Allowed opportunity for questions and acknowledgement of understanding      Vitals:  Vitals Value Taken Time   BP     Temp     Pulse     Resp     SpO2 97 % 02/04/22 1436   Vitals shown include unvalidated device data.    Electronically Signed By: BARBARA Joseph CRNA  February 4, 2022  2:37 PM

## 2022-02-04 NOTE — ANESTHESIA PROCEDURE NOTES
Airway       Patient location during procedure: OR       Procedure Start/Stop Times: 2/4/2022 12:40 PM and 2/4/2022 12:45 PM  Staff -        Anesthesiologist:  Austin Fuller MD       CRNA: Emily Adams APRN CRNA       Performed By: anesthesiologistIndications and Patient Condition       Indications for airway management: jomar-procedural       Induction type:intravenous       Mask difficulty assessment: 2 - vent by mask + OA or adjuvant +/- NMBA    Final Airway Details       Final airway type: endotracheal airway       Successful airway: ETT - single  Endotracheal Airway Details        ETT size (mm): 8.0       Cuffed: yes       Cuff volume (mL): 5       Successful intubation technique: direct laryngoscopy       DL Blade Type: MAC 4       Grade View of Cords: 4       Adjucts: bougie       Position: Right       Measured from: lips       Secured at (cm): 22       Bite block used: Soft    Post intubation assessment        Placement verified by: capnometry, equal breath sounds and chest rise        Number of attempts at approach: 2       Secured with: silk tape       Ease of procedure: difficult       Dentition: Lips/oral mucosa injury    Additional Comments       Smooth intravenous induction and bag/mask ventilation with Size 10 oropharyngeal airway. Initial laryngoscopy attempt with Aquino #2 blade by CRNA showed Grade 3 Cormack and Lehane view with only  large epiglottis, large/long front teeth, anterior glotttis position with no view of cords visualized.  Bag/mask ventilation resumed and the second direct laryngoscopy was performed by the attending anesthesiologist with MAC 4 blade. This time the epiglottis was clearly visible, and externally positioning the glottis upward, right and posterior the posterior aspect or the glottis (Grade 3 CL view), including a small portion of the glottic opening was brought into view. This view was maintained by the attending anesthesiologist and the CRNA introduced the  bougie without difficulties. The ETT had to be turned 180 degrees (bevel down) to advance it into the trachea. Sustained EtCO2 on capnography, breath sounds are bilateral and equal. Minimal left upper lip lesion was noted.The patient's oxygenation was in physiological range during the entire airway manipulation. Video laryngoscopy with consideration of back up airway management equipment (SGA, fiberoptic intubation) are recommended for subsequent endotracheal intubations.

## 2022-02-04 NOTE — OR NURSING
After getting patients belongings found his glasses were broken,they were in the bubble wrapper and in side pocket of his bag. Patient relations card given to karson and he will follow up.

## 2022-02-04 NOTE — ANESTHESIA PROCEDURE NOTES
Airway       Patient location during procedure: OR       Procedure Start/Stop Times: 2/4/2022 12:45 PM  Staff -        Anesthesiologist:  Austin Fuller MD       CRNA: Emily Adams APRN CRNA       Performed By: CRNA and anesthesiologist  Consent for Airway        Urgency: elective  Indications and Patient Condition       Indications for airway management: jomar-procedural       Induction type:intravenous       Mask difficulty assessment: 2 - vent by mask + OA or adjuvant +/- NMBA    Final Airway Details       Final airway type: endotracheal airway       Successful airway: ETT - single  Endotracheal Airway Details        ETT size (mm): 8.0       Cuffed: yes       Successful intubation technique: direct laryngoscopy       DL Blade Type: MAC 4       Grade View of Cords: 4       Adjucts: bougie       Position: Right       Measured from: lips       Secured at (cm): 23       Bite block used: None    Post intubation assessment        Placement verified by: capnometry, equal breath sounds and chest rise        Number of attempts at approach: 2       Number of other approaches attempted: 1       Secured with: silk tape       Ease of procedure: difficult       Dentition: Lips/oral mucosa injury    Additional Comments       Attempt by CRNA with Aquino 2 blade - grade 4 view.  Attempt by MDA with MAC 4 blade - grade 4 view.  Large epiglottis, large/long front teeth, anterior airway with no view of cords.  A bougie was used while the MDA held his grade 4 view and the ETT was successfully passed through the cords. Intubation was not attempted with a video laryngoscope.  Both attempts were by direct laryngoscopy.

## 2022-02-05 VITALS
WEIGHT: 201.28 LBS | BODY MASS INDEX: 28.82 KG/M2 | HEART RATE: 60 BPM | SYSTOLIC BLOOD PRESSURE: 110 MMHG | DIASTOLIC BLOOD PRESSURE: 62 MMHG | OXYGEN SATURATION: 96 % | TEMPERATURE: 96 F | RESPIRATION RATE: 18 BRPM | HEIGHT: 70 IN

## 2022-02-05 LAB
ERYTHROCYTE [DISTWIDTH] IN BLOOD BY AUTOMATED COUNT: 11.8 % (ref 10–15)
HCT VFR BLD AUTO: 39.7 % (ref 40–53)
HGB BLD-MCNC: 13.5 G/DL (ref 13.3–17.7)
MCH RBC QN AUTO: 31 PG (ref 26.5–33)
MCHC RBC AUTO-ENTMCNC: 34 G/DL (ref 31.5–36.5)
MCV RBC AUTO: 91 FL (ref 78–100)
PLATELET # BLD AUTO: 226 10E3/UL (ref 150–450)
RBC # BLD AUTO: 4.35 10E6/UL (ref 4.4–5.9)
WBC # BLD AUTO: 13.5 10E3/UL (ref 4–11)

## 2022-02-05 PROCEDURE — 36415 COLL VENOUS BLD VENIPUNCTURE: CPT | Performed by: UROLOGY

## 2022-02-05 PROCEDURE — 85027 COMPLETE CBC AUTOMATED: CPT | Performed by: UROLOGY

## 2022-02-05 PROCEDURE — 250N000013 HC RX MED GY IP 250 OP 250 PS 637: Performed by: UROLOGY

## 2022-02-05 RX ADMIN — PANTOPRAZOLE SODIUM 40 MG: 40 TABLET, DELAYED RELEASE ORAL at 08:16

## 2022-02-05 RX ADMIN — LOSARTAN POTASSIUM 50 MG: 50 TABLET, FILM COATED ORAL at 08:16

## 2022-02-05 RX ADMIN — AMLODIPINE BESYLATE 5 MG: 5 TABLET ORAL at 08:14

## 2022-02-05 RX ADMIN — METOPROLOL SUCCINATE 50 MG: 25 TABLET, EXTENDED RELEASE ORAL at 08:14

## 2022-02-05 RX ADMIN — POLYETHYLENE GLYCOL 3350 17 G: 17 POWDER, FOR SOLUTION ORAL at 08:18

## 2022-02-05 ASSESSMENT — ACTIVITIES OF DAILY LIVING (ADL)
PATIENT_/_FAMILY_COMMUNICATION_STYLE: SPOKEN LANGUAGE (ENGLISH OR BILINGUAL)
DESCRIBE_HEARING_LOSS: BILATERAL HEARING LOSS
WERE_AUXILIARY_AIDS_OFFERED?: NO
DOING_ERRANDS_INDEPENDENTLY_DIFFICULTY: NO
DRESSING/BATHING_DIFFICULTY: NO
TOILETING_ISSUES: NO
THE_FOLLOWING_AIDS_WERE_PROVIDED;: PATIENT DECLINED OFFER OF AUXILIARY AIDS
DIFFICULTY_COMMUNICATING: NO
DIFFICULTY_EATING/SWALLOWING: NO
HEARING_DIFFICULTY_OR_DEAF: YES
PATIENT'S_PREFERRED_MEANS_OF_COMMUNICATION: VERBAL
WALKING_OR_CLIMBING_STAIRS_DIFFICULTY: NO
WEAR_GLASSES_OR_BLIND: YES
CONCENTRATING,_REMEMBERING_OR_MAKING_DECISIONS_DIFFICULTY: NO
FALL_HISTORY_WITHIN_LAST_SIX_MONTHS: NO

## 2022-02-05 NOTE — PROGRESS NOTES
"Urology  Progress Note    NAEO  Last   No more clots when voiding  Pain controlled    Exam  /67 (BP Location: Left arm)   Pulse 72   Temp 97.3  F (36.3  C) (Oral)   Resp 20   Ht 1.778 m (5' 10\")   Wt 91.3 kg (201 lb 4.5 oz)   SpO2 97%   BMI 28.88 kg/m    No acute distress  Unlabored breathing  Abdomen soft, nontender, nondistended.     UOP unmeasured    Labs  No labs    Assessment/Plan  66 year old y/o male POD#1 s/p HOLEP for BPH.     Neuro: APAP, prn oxy for pain control  CV: FELISHA  Pulm: incentive spirometry while awake  FEN/GI: regular diet, MIVF @ 50/hr  Endo: FELISHA  : Low PVR most recently. Please obtain one additional PVR and notify Urology, we will discharge if low  Heme/ID: FELISHA  Activity: Up ad antwan  PPx: SCDs  Dispo: potential discharge today    Seen and examined with the chief resident. Will discuss with Dr. Jhaveri.    Garland Arora MD, PGY-2  Urology Resident     Contacting the Urology Team     Please use the following job codes to reach the Urology Team. Note that you must use an in house phone and that job codes cannot receive text pages.     On weekdays, dial [phone] (or star-star-star 777 on the new [employer] telephones) then [phone] to reach the Adult Urology resident or PA on call    On weekdays, dial [phone] (or star-star-star 777 on the new [employer] telephones) then [phone] to reach the Pediatric Urology resident    On weeknights and weekends, dial [phone] (or star-star-star 777 on the new [employer] telephones) then [phone] to reach the Urology resident on call (for both Adult and Pediatrics)              "

## 2022-02-05 NOTE — PLAN OF CARE
"  VS: Blood pressure 138/74, pulse 81, temperature (!) 95.4  F (35.2  C), temperature source Oral, resp. rate 20, height 1.778 m (5' 10\"), weight 91.3 kg (201 lb 4.5 oz), SpO2 91 %.     O2: >92% on RA. Denies chest pain/SOB   Output: Urinary retention  2030 Bladder scan - 460ml  Voided 250ml -bloody urine with clots  2130 Bladder scan-380ml  @2155 Voided 600ml-bloody urine with clots  @2155 Bladder scan 190ml   Last BM: Yesterday 2/3/2022- per pt report   Activity: Independent   Skin: Intact   Pain: Pain with urination   Oxycodone and tylenol available   CMS: Alert/oriented x4   Dressing: None   Diet: Regular diet- ate dinner; tolerating well   LDA: PIV infusing LR 50ml/hr   Equipment: IV pole/pump; capnography; personal belonging   Plan: Possible discharge tomorrow if pt void well and clear from urology   Additional Info: Per order- only place indwelling catheter if PVR above 300 AND pt experience symptoms AND unable to void.    Per report from the recovery room nurse, Pt.'s [facility] and Pt.'s discharge abx med is in the Meds room.        "

## 2022-02-05 NOTE — PLAN OF CARE
Patient A/Ox4. VSS. Denies CP, SOB, dizziness/LH. LSCTA. +fl/BS. Voiding well in bathroom, urine is red but no clots noted. CMS intact. Tolerating regular diet without NV. Activity level is good, walked to bathroom independently. IV SL. Pain rated as well managed throughout shift, no PRNs requested, pt says he only has burning with urination; states it is improving. Likely discharge home today, pt prefers before 11am if possible. Patient has demonstrated ability to call appropriately. Patient is resting with call light within reach. Will continue to monitor.

## 2022-02-05 NOTE — PROVIDER NOTIFICATION
Dr Jhaveri paged; awaiting further orders.       02/04/22 1800   Output (mL)   Voided (mL) 100 mL   Urine Assessment   Urine Characteristics Red;Clots   Bladder Scan Volume (mL) 301 ml   Unmeasured Output   Urine Occurrence 1

## 2022-02-05 NOTE — PLAN OF CARE
"  VS: Blood pressure 110/62, pulse 60, temperature (!) 96  F (35.6  C), temperature source Oral, resp. rate 18, height 1.778 m (5' 10\"), weight 91.3 kg (201 lb 4.5 oz), SpO2 96 %.  Denies SOB, CP, N/T   O2: RA. LS CTA   Output: Straining to void, burning with urination. Red urine  Void 850, PVR 61   Last BM: 2/3, miralax given. BS+   Activity: Independent   Skin: Visible skin intact. Pt denies issues   Pain: Burning with urination tolerable   CMS: Intact. AxO. Numbness to LLE post covid vaccination unchanged per pt   Dressing: NA   Diet: Regular, tolerating   LDA: PIV SL   Equipment: IV pump, personal belonings   Plan: Discharge to home pending CBC result   Additional Info: Pleasant and cooperative with cares      "

## 2022-02-05 NOTE — OR NURSING
Per Dr Jhaveri to admit for observation. No taylor required, see orders. Patient meeting criteria for discharge from recovery. VSS, denies pain, on room air and ambulating without assistance. Tolerating PO intake. Urine remains cherry with significant amount of clots. Continue to monitor and observe per Dr Jhaveri's orders. Full report given to TRAMAINE Michel, patient to admit to room 538. Wife Roleen given phone updates.

## 2022-02-05 NOTE — PLAN OF CARE
DISCHARGE SUMMARY    Pt discharging to: home  Transportation: wheelchair to family   AVS given and discussed: yes  Stoplight Tool given and discussed: NA  Medications given: yes  Belongings returned: yes  Comments: tolerated well

## 2022-02-07 LAB
PATH REPORT.COMMENTS IMP SPEC: NORMAL
PATH REPORT.COMMENTS IMP SPEC: NORMAL
PATH REPORT.FINAL DX SPEC: NORMAL
PATH REPORT.GROSS SPEC: NORMAL
PATH REPORT.MICROSCOPIC SPEC OTHER STN: NORMAL
PATH REPORT.RELEVANT HX SPEC: NORMAL
PHOTO IMAGE: NORMAL

## 2022-02-08 NOTE — OP NOTE
Operative Report  2/7/2022    PREOPERATIVE DIAGNOSIS:  Prostatic hypertrophy with lower urinary tract symptoms  POSTOPERATIVE DIAGNOSIS: Same as above    PROCEDURE PERFORMED: Holmium laser enucleation of the prostate  ATTENDING SURGEON: Francisco Javier Jhaveri MD    FINDINGS: Approximately 80 gm prostate with bilateral lobe hypertrophy. Bladder with mild trabeculation  ANESTHESIA: General  INTRAVENOUS FLUIDS: See anesthesia records  ESTIMATED BLOOD LOSS: Less than 100 ml   SPECIMENS: Prostate adenoma  DRAINS: 22-Mosotho 3-way catheter with 60 ml in balloon     INDICATIONS FOR PROCEDURE: Billy Velásquez is a(n) 66 year old male who was seen in consultation for BPH with obstructive voiding symptoms. He has elected treatment with laser enucleation. Prostate volume estimated to be  grams. His digital rectal exam was unremarkable.  After discussion of the risks, benefits and alternatives of the procedure, the patient agreed to proceed with the above stated procdure.    DESCRIPTION OF PROCEDURE: After obtaining informed consent, the patient was taken to the operating room and placed under general anesthesia.  He was repositioned in dorsal lithotomy making sure that the legs were positioned and padded safely.  He was then prepped and draped in standard sterile fashion.  Culture directed antibiotics were administered and bilateral sequential compression devices were placed.  A time out was performed confirming the appropriate patient identity and planned procedure.     The procedure was begun by generously lubricating the urethra.  The urethra was noted to be patent and did not require use of the andre urethrotome in order to place the 26F outer sheath.  The outer sheath was placed over a deflecting obturator and we then looked the scope through the posterior urethra and into the bladder.  The prostate was noted to have a bilobar configuration.  At this point we inserted the 550 micron holmium laser through the 7F laser  catheter.    We began enucleation by making an apical incision adjacent to the veromontanum.  We developed the apical plane on the left side and carried this laterally until 3 oclock.  We then proceeded to make a counter incision in the same fashion on the right side.  We next dissected the prostate out in a circumferential fashion, coming over the top of the gland and entering the bladder neck.  We next identified the mucosal strip anteriorly and transected it with the laser.  We then proceeded to take down the remaining lateral and posterior attachments which allowed us to push the adenoma in an en-bloc fashion into the bladder. Total enucleation time was 53 minutes.    At this point there was a moderate amount of bleeding and approximately 10 minutes were spent identifying bleeding vessels in the fossa and coagulating them with the laser.  Once hemostasis was adequate we switched from the laser resectoscope to the 26F offset telescope with the [place] morcellation device.  We added an extra inflow to distend the bladder.  The blades were adjusted and set at a rate of 1500 RPM.  We proceeded with morcellation making sure that we morcellated the entirety of the adenoma.  Total morcellation time was 7 minutes.     We then switched back to the laser resectoscope one final time to ensure that no residual tissue was left in the bladder.  We also confirmed that the bladder was unharmed from morcellation and that both ureteral orifices were unharmed during the procedure.  We inspected the fossa and obtained final hemostasis.   We looked the scope out noting that the sphincter was completely intact without evidence of thermal or mechanical injury.     We lubricated the urethra again and passed a 22F 3-way taylor catheter over a catheter guide.  The urine was noted to be clear.  We filled the balloon with 60 ml of sterile water and DID place the catheter on traction.  The patient was woken from anesthesia and taken to the  recovery room in stable condition.     The specimen was weighed and found to be approximately 53 g.     POSTOPERATIVE PLAN:   -We will monitor the patient post operatively on continuous bladder irrigation for signs of ongling bleeding.  If clear we will consider discharge with taylor removal as an outpatient.  If continues to have ongoing bleeding concerning for clot formation without bladder irrigation will plan to admit for observation    Francisco Javier Jhaveri

## 2022-02-10 ENCOUNTER — TRANSFERRED RECORDS (OUTPATIENT)
Dept: HEALTH INFORMATION MANAGEMENT | Facility: CLINIC | Age: 67
End: 2022-02-10
Payer: COMMERCIAL

## 2022-02-10 ENCOUNTER — NURSE TRIAGE (OUTPATIENT)
Dept: NURSING | Facility: CLINIC | Age: 67
End: 2022-02-10

## 2022-02-10 ENCOUNTER — TELEPHONE (OUTPATIENT)
Dept: UROLOGY | Facility: CLINIC | Age: 67
End: 2022-02-10
Payer: COMMERCIAL

## 2022-02-10 DIAGNOSIS — R31.9 HEMATURIA: ICD-10-CM

## 2022-02-10 DIAGNOSIS — R30.0 DYSURIA: Primary | ICD-10-CM

## 2022-02-10 NOTE — TELEPHONE ENCOUNTER
Spoke to pt. Pt is currently on antibiotics and will be done with it tomorrow. Pt is concerned he may have an infection. Blood in urine improved with pushing fluids. Pt is taking tylenol for pain with urination. Pt reports that pain is pretty bad when he initiates urine stream, but improves as he goes. Pt requests for orders to be sent to local lab, fax [phone]. Chart and problems list reviewed.    Loretta Dawson RN MSN    Orders Placed This Encounter   Procedures     Routine UA with microscopic - No culture     Standing Status:   Future     Standing Expiration Date:   2/10/2023     Urine Culture Aerobic Bacterial     Standing Status:   Future     Standing Expiration Date:   2/10/2023

## 2022-02-10 NOTE — TELEPHONE ENCOUNTER
2/4/2022 ENUCLEATION, PROSTATE, USING HOLMIUM LASER    Pt reporting, when he woke up this morning, he noticed more blood in the toilet when he urinated.    It was more dark red in color and pooled at the bottom of the toilet.     Now there is streaks of blood running down the side of the toilet when he urinates and it is pooling at the bottom of the toilet.    Also it burn and stings when he urinates and having a lot of urgency.    No fever, hot sweats, cold sweats or the chills  But Pt wondering if he has an infection.     Pt would like a call back from the clinic.    Please call [phone] for further assistance.    Jolene Brambila RN  Central Triage Red Flags/Med Refills      Reason for Disposition    Discomfort (pain, burning or stinging) when passing urine and male    All other males with painful urination, or patient wants to be seen    Additional Information    Negative: Shock suspected (e.g., cold/pale/clammy skin, too weak to stand, low BP, rapid pulse)    Negative: Sounds like a life-threatening emergency to the triager    Negative: Followed a genital area injury    Negative: Followed a genital area injury (penis, scrotum)    Negative: Vaginal discharge    Negative: Pus (white, yellow) or bloody discharge from end of penis    Negative: Discomfort (pain, burning or stinging) when passing urine and pregnant    Negative: Discomfort (pain, burning or stinging) when passing urine and female    Negative: Shock suspected (e.g., cold/pale/clammy skin, too weak to stand, low BP, rapid pulse)    Negative: Sounds like a life-threatening emergency to the triager    Negative: Unable to urinate (or only a few drops) and bladder feels very full    Negative: Swollen scrotum    Negative: Pain in scrotum or testicle that persists > 1 hour    Negative: Fever > 100.4 F (38.0 C)    Negative: Vomiting    Negative: Patient sounds very sick or weak to the triager    Negative: SEVERE pain with urination    Negative: Side (flank) or  lower back pain present    Negative: Taking antibiotic > 24 hours for UTI and fever persists    Negative: Taking antibiotic > 3 days for UTI and painful urination not improved    Negative: Taking treatment > 3 days for STD (e.g., penile discharge from gonorrhea, chlamydia) and painful urination not improved    Protocols used: URINARY SYMPTOMS-A-OH, URINATION PAIN - MALE-A-OH

## 2022-02-24 ENCOUNTER — PATIENT OUTREACH (OUTPATIENT)
Dept: UROLOGY | Facility: CLINIC | Age: 67
End: 2022-02-24
Payer: COMMERCIAL

## 2022-02-24 NOTE — TELEPHONE ENCOUNTER
Pt calls into clinic requesting a return to work letter. Pt states he would like his restrictions outlined- he is planning a return to work date of this coming Monday, 2/28.    Pt has no work restrictions, as his job is not a physical one. Letter drafted.     Pt requested this return to work be emailed to  at   [email or web address]    An email has been sent with request of a secure fax number. He is okay to return to work without restrictions 2/18

## 2022-03-03 ENCOUNTER — PRE VISIT (OUTPATIENT)
Dept: UROLOGY | Facility: CLINIC | Age: 67
End: 2022-03-03
Payer: COMMERCIAL

## 2022-03-03 NOTE — TELEPHONE ENCOUNTER
Reason for visit: post op HoLEP     Dx/Hx/Sx: prostatic hypertrophy with LUTS    Records/imaging/labs/orders: UA/UC from 2/10 in [facility]    At Rooming: video visit

## 2022-03-18 ENCOUNTER — VIRTUAL VISIT (OUTPATIENT)
Dept: UROLOGY | Facility: CLINIC | Age: 67
End: 2022-03-18
Payer: COMMERCIAL

## 2022-03-18 DIAGNOSIS — Z98.890 HISTORY OF PROSTATE SURGERY: Primary | ICD-10-CM

## 2022-03-18 DIAGNOSIS — R39.198 SLOWING OF URINARY STREAM: ICD-10-CM

## 2022-03-18 PROCEDURE — 99024 POSTOP FOLLOW-UP VISIT: CPT | Mod: 95 | Performed by: NURSE PRACTITIONER

## 2022-03-18 NOTE — NURSING NOTE
I was unable to reach patient via phone, so no intake information was gathered from me for today's visit

## 2022-03-18 NOTE — PROGRESS NOTES
Billy is a 66 year old who is being evaluated via a billable video visit. After multiple attempts to connect with the patient with both [employer] and [employer] applications, a shared decision was made to convert our visit to telephone for the sake of time.      How would you like to obtain your AVS? MyChart  If the video visit is dropped, the invitation should be resent by: Send to e-mail at: [email or web address]  Will anyone else be joining your video visit? No    Duration of telephone call: 26 minutes       Billy Velásquez complains of   Chief Complaint   Patient presents with     Surgical Followup       Assessment/Plan:  66 year old male with a history of BPH with obstructive LUTS, now 6 weeks s/p HoLEP, at which time 53 g of benign tissue was removed. Stream has been very slow after surgery and does not seem to be improving; can take a prolonged amount of time to empty his bladder. Case discussed with Dr. Jhaveri and follow up cystoscopy recommended to evaluate for a stricture/bladder neck contracture, which is rare but can occur in ~3% of HoLEP patients. This was discussed with the patient.   -Will schedule add-on cystoscopy with Dr. Jhaveri at his next available clinic day in 3 weeks. Patient advised to seek ED evaluation if he were to exerperience urinary retention in the interim.     Liz Evans, CNP  Department of Urology      Subjective:   66 year old male with a history of BPH with obstructive LUTS who is now s/p HoLEP with Dr. Jhaveri on 2/4/22. 53 g of benign prostatic tissue was removed.     He has some post-op concerns today. Since surgery, he has had some burning with some pink urine at times. His primary concern, however, is the slow stream that he has had since surgery. It now usually takes him about one full minute to empty his bladder. Denies any hesitancy as he is usually able to start voiding promptly. His urine usually comes out in just a dribble. This slow stream has been the same since  surgery. He did feel like it was strong for about one day after his [place] came out before slowing down to where it is now. Also now getting up twice per night to urinate, which he did not do prior to surgery. He did see PFPT prior to surgery but has not seen them after. Very little dribbling/leakage issues.

## 2022-03-18 NOTE — LETTER
3/18/2022       RE: Billy Velásquez  [address]  [place] [address]     Dear Colleague,    Thank you for referring your patient, Billy Velásquez, to the [facility] [place] at [facility]. Please see a copy of my visit note below.    Billy is a 66 year old who is being evaluated via a billable video visit. After multiple attempts to connect with the patient with both [employer] and [employer] applications, a shared decision was made to convert our visit to telephone for the sake of time.      How would you like to obtain your AVS? MyChart  If the video visit is dropped, the invitation should be resent by: Send to e-mail at: [email or web address]  Will anyone else be joining your video visit? No    Duration of telephone call: 26 minutes       Billy Velásquez complains of   Chief Complaint   Patient presents with     Surgical Followup       Assessment/Plan:  66 year old male with a history of BPH with obstructive LUTS, now 6 weeks s/p HoLEP, at which time 53 g of benign tissue was removed. Stream has been very slow after surgery and does not seem to be improving; can take a prolonged amount of time to empty his bladder. Case discussed with Dr. Jhaveri and follow up cystoscopy recommended to evaluate for a stricture/bladder neck contracture, which is rare but can occur in ~3% of HoLEP patients. This was discussed with the patient.   -Will schedule add-on cystoscopy with Dr. Jhaveri at his next available clinic day in 3 weeks. Patient advised to seek ED evaluation if he were to exerperience urinary retention in the interim.     Liz Evans, CNP  Department of Urology      Subjective:   66 year old male with a history of BPH with obstructive LUTS who is now s/p HoLEP with Dr. Jhaveri on 2/4/22. 53 g of benign prostatic tissue was removed.     He has some post-op concerns today. Since surgery, he has had some burning with some pink urine at times. His  primary concern, however, is the slow stream that he has had since surgery. It now usually takes him about one full minute to empty his bladder. Denies any hesitancy as he is usually able to start voiding promptly. His urine usually comes out in just a dribble. This slow stream has been the same since surgery. He did feel like it was strong for about one day after his [place] came out before slowing down to where it is now. Also now getting up twice per night to urinate, which he did not do prior to surgery. He did see PFPT prior to surgery but has not seen them after. Very little dribbling/leakage issues.

## 2022-03-18 NOTE — PATIENT INSTRUCTIONS
UROLOGY CLINIC VISIT PATIENT INSTRUCTIONS    Schedule add-on cystoscopy with Dr. Jhaveri on Tuesday, 4/5/22. Message sent to Dr. Jhaveri's clinic staff to arrange.     Liz Evans CNP  Department of Urology

## 2022-03-22 ENCOUNTER — PRE VISIT (OUTPATIENT)
Dept: UROLOGY | Facility: CLINIC | Age: 67
End: 2022-03-22
Payer: COMMERCIAL

## 2022-03-22 NOTE — TELEPHONE ENCOUNTER
Reason for visit: CYSTOSCOPY     Dx/Hx/Sx: Slowing of Urinary Stream    Records/imaging/labs/orders: In EPIC    At Rooming: standard; evaluation for a stricture/bladder neck contracture post-HoLEP.

## 2022-03-25 ENCOUNTER — DOCUMENTATION ONLY (OUTPATIENT)
Dept: UROLOGY | Facility: CLINIC | Age: 67
End: 2022-03-25
Payer: COMMERCIAL

## 2022-03-25 NOTE — NURSING NOTE
Agustina provider added referral for state insurance to cover urology visits for [id number] year.     [id number]

## 2022-04-03 ENCOUNTER — HEALTH MAINTENANCE LETTER (OUTPATIENT)
Age: 67
End: 2022-04-03

## 2022-04-05 ENCOUNTER — OFFICE VISIT (OUTPATIENT)
Dept: UROLOGY | Facility: CLINIC | Age: 67
End: 2022-04-05
Payer: COMMERCIAL

## 2022-04-05 VITALS
HEIGHT: 70 IN | SYSTOLIC BLOOD PRESSURE: 124 MMHG | BODY MASS INDEX: 29.49 KG/M2 | HEART RATE: 69 BPM | DIASTOLIC BLOOD PRESSURE: 78 MMHG | WEIGHT: 206 LBS

## 2022-04-05 DIAGNOSIS — R39.198 SLOWING OF URINARY STREAM: Primary | ICD-10-CM

## 2022-04-05 PROCEDURE — 52000 CYSTOURETHROSCOPY: CPT | Mod: 58 | Performed by: UROLOGY

## 2022-04-05 PROCEDURE — 51798 US URINE CAPACITY MEASURE: CPT | Mod: 58 | Performed by: UROLOGY

## 2022-04-05 PROCEDURE — 51741 ELECTRO-UROFLOWMETRY FIRST: CPT | Mod: 58 | Performed by: UROLOGY

## 2022-04-05 RX ORDER — LIDOCAINE HYDROCHLORIDE 20 MG/ML
JELLY TOPICAL ONCE
Status: COMPLETED | OUTPATIENT
Start: 2022-04-05 | End: 2022-04-05

## 2022-04-05 RX ADMIN — LIDOCAINE HYDROCHLORIDE: 20 JELLY TOPICAL at 14:15

## 2022-04-05 ASSESSMENT — PAIN SCALES - GENERAL: PAINLEVEL: NO PAIN (0)

## 2022-04-05 NOTE — PATIENT INSTRUCTIONS
"Please schedule a 4 week in person follow-up with Dr. Jhaveri or Ashley Evans CNP.    It was a pleasure meeting with you today.  Thank you for allowing me and my team the privilege of caring for you today.  YOU are the reason we are here, and I truly hope we provided you with the excellent service you deserve.  Please let us know if there is anything else we can do for you so that we can be sure you are leaving completely satisfied with your care experience.      TULIO Ayala    AFTER YOUR CYSTOSCOPY        You have just completed a cystoscopy, or \"cysto\", which allowed your physician to learn more about your bladder (or to remove a stent placed after surgery). We suggest that you continue to avoid caffeine, fruit juice, and alcohol for the next 24 hours, however, you are encouraged to return to your normal activities.         A few things that are considered normal after your cystoscopy:     * Small amount of bleeding (or spotting) that clears within the next 24 hours     * Slight burning sensation with urination     * Sensation to of needing to avoid more frequently     * The feeling of \"air\" in your urine     * Mild discomfort that is relieved with Tylenol        Please contact our office promptly if you:     * Develop a fever above 101 degrees     * Are unable to urinate     * Develop bright red blood that does not stop     * Severe pain or swelling         Please contact our office with any concerns or questions @[phone]    "

## 2022-04-05 NOTE — LETTER
"4/5/2022       RE: Billy Velásquez  [address]  [place] [address]     Dear Colleague,    Thank you for referring your patient, Billy Velásquez, to the [facility] [place] at [facility]. Please see a copy of my visit note below.    Chief Complaint   Patient presents with     Cystoscopy     Slowing of urinary stream       Blood pressure 124/78, pulse 69, height 1.778 m (5' 10\"), weight 93.4 kg (206 lb). Body mass index is 29.56 kg/m .    Patient Active Problem List   Diagnosis     Hard to intubate     Enlarged prostate       Allergies   Allergen Reactions     Isosorbide      Other reaction(s): Headache     Lisinopril Cough       Current Outpatient Medications   Medication Sig Dispense Refill     acetaminophen (TYLENOL) 325 MG tablet Take 2 tablets (650 mg) by mouth every 4 hours as needed for other (mild pain) 100 tablet 0     amLODIPine (NORVASC) 5 MG tablet Take 5 mg by mouth daily       Apoaequorin (PREVAGEN) 10 MG CAPS Take 1 tablet by mouth daily        aspirin (ASTON ASPIRIN EC LOW DOSE) 81 MG EC tablet Take 81 mg by mouth daily        cholecalciferol 50 MCG (2000 UT) tablet Take 1 tablet by mouth daily        Co-Enzyme Q-10 15 MG TABS        coenzyme Q-10 200 MG CAPS Take 200 mg by mouth daily       hydrochlorothiazide (HYDRODIURIL) 25 MG tablet Take 25 mg by mouth       loratadine (CLARITIN REDITABS) 10 MG ODT Take 10 mg by mouth daily        losartan (COZAAR) 50 MG tablet Take 50 mg by mouth daily        Melatonin 10 MG TABS tablet Take 10 mg by mouth nightly as needed for sleep       metoprolol succinate ER (TOPROL-XL) 50 MG 24 hr tablet Take 50 mg by mouth daily        multivitamin (CENTRUM SILVER) tablet Take 1 tablet by mouth daily        nitroGLYcerin (NITROSTAT) 0.4 MG sublingual tablet Place 0.4 mg under the tongue every 5 minutes as needed for chest pain For chest pain place 1 tablet under the tongue every 5 minutes for 3 doses. " If symptoms persist 5 minutes after 1st dose call 911.       Omega-3 Fatty Acids (OMEGA-3 FISH OIL) 300 MG CAPS        omeprazole 20 MG tablet Take 20 mg by mouth daily        predniSONE (DELTASONE) 10 MG tablet Take 6 PO daily x 3 days, then taper by 1 pill every day.       rosuvastatin (CRESTOR) 40 MG tablet Take 40 mg by mouth daily        nitroFURantoin macrocrystal-monohydrate (MACROBID) 100 MG capsule Take 1 capsule (100 mg) by mouth 2 times daily 14 capsule 0     senna-docusate (SENOKOT-S/PERICOLACE) 8.6-50 MG tablet Take 1-2 tablets by mouth 2 times daily Take while on oral narcotics to prevent or treat constipation. 30 tablet 0     tamsulosin (FLOMAX) 0.4 MG capsule Take 0.4 mg by mouth daily (Patient not taking: Reported on 2022)         Social History     Tobacco Use     Smoking status: Never Smoker     Smokeless tobacco: Never Used   Substance Use Topics     Alcohol use: Never     Drug use: Never       Invasive Procedure Safety Checklist:    Procedure: Cystoscopy    Action: Complete sections and checkboxes as appropriate.    Pre-procedure:  1. Patient ID Verified with 2 identifiers (Mesha and  or MRN) : YES    2. Procedure and site verified with patient/designee (when able) : YES    3. Accurate consent documentation in medical record : YES    4. H&P (or appropriate assessment) documented in medical record : N/A  H&P must be up to 30 days prior to procedure an updated within 24 hours of                 Procedure as applicable.     5. Relevant diagnostic and radiology test results appropriately labeled and displayed as applicable : YES    6. Blood products, implants, devices, and/or special equipment available for the procedure as applicable : YES    7. Procedure site(s) marked with provider initials [Exclusions: none] : NO    8. Marking not required. Reason : Yes  Procedure does not require site marking    Time Out:     Time-Out performed immediately prior to starting procedure, including verbal and  active participation of all team members addressing: YES    1. Correct patient identity.  2. Confirmed that the correct side and site are marked.  3. An accurate procedure to be done.  4. Agreement on the procedure to be done.  5. Correct patient position.  6. Relevant images and results are properly labeled and appropriately displayed.  7. The need to administer antibiotics or fluids for irrigation purposes during the procedure as applicable.  8. Safety precautions based on patient history or medication use.    During Procedure: Verification of correct person, site, and procedure occurs any time the responsibility for care of the patient is transferred to another member of the care team.    The following medication was given:     MEDICATION:  Lidocaine without epinephrine 2% jelly  ROUTE: urethral   SITE: urethral   DOSE: 10 mL  LOT #: [id number]  : [employer]  EXPIRATION DATE: 9-23  NDC#: [address]-1   Was there drug waste? No    Prior to med admin, verified patient identity using patient's name and date of birth.  Due to med administration, patient instructed to remain in clinic for 15 minutes  afterwards, and to report any adverse reaction to me immediately.    Drug Amount Wasted:  None.  Vial/Syringe: Syringe      James Nichols, EMT  4/5/2022  1:56 PM    CYSTOSCOPY PROCEDURE NOTE    Reason for cystoscopy: Slow stream    Brief History: 67 yo M with hx of HoLEP 2/4/22, initially voiding well but with slowing stream since. 53 gm benign tissue removed.    CYSTOSCOPY  After obtaining informed consent, the patient was prepped and draped in the standard sterile fashion.  The 15 Pashto flexible cystoscope was inserted through the urethral meatus.      The anterior urethra was:  normal without stricture.    The external sphincter was  appropriately coapted. There was a stricture at the backside of the external sphincter that was flimsy and easily bypassed/broken with the cystoscopy.   The holep fossa was otherwise widely patent and the bladder without pathology.  The bladder neck was open.    We passed a 20F taylor easily once removing the cystoscope    The patient tolerated the procedure well without complication.      Uroflow after cysto  ump uro prostate review 4/5/2022   Peak Flow 29.4   Average Flow 14.6   Residual Volume by Ultrasound 28   Character of Curve Can not be characterized     Assessment/Plan: 67 yo M with urethral stricture post holep, dilated in office  -RTC for UF/PVR in 4 weeks  -If symptoms recur will plan for formal DVIU in OR    I, Francisco Javier Jhaveri saw and evaluated this patient and agree with the plan as stated above.  I personally performed all listed procedures.

## 2022-04-05 NOTE — PROGRESS NOTES
"Chief Complaint   Patient presents with     Cystoscopy     Slowing of urinary stream       Blood pressure 124/78, pulse 69, height 1.778 m (5' 10\"), weight 93.4 kg (206 lb). Body mass index is 29.56 kg/m .    Patient Active Problem List   Diagnosis     Hard to intubate     Enlarged prostate       Allergies   Allergen Reactions     Isosorbide      Other reaction(s): Headache     Lisinopril Cough       Current Outpatient Medications   Medication Sig Dispense Refill     acetaminophen (TYLENOL) 325 MG tablet Take 2 tablets (650 mg) by mouth every 4 hours as needed for other (mild pain) 100 tablet 0     amLODIPine (NORVASC) 5 MG tablet Take 5 mg by mouth daily       Apoaequorin (PREVAGEN) 10 MG CAPS Take 1 tablet by mouth daily        aspirin (ASTON ASPIRIN EC LOW DOSE) 81 MG EC tablet Take 81 mg by mouth daily        cholecalciferol 50 MCG (2000 UT) tablet Take 1 tablet by mouth daily        Co-Enzyme Q-10 15 MG TABS        coenzyme Q-10 200 MG CAPS Take 200 mg by mouth daily       hydrochlorothiazide (HYDRODIURIL) 25 MG tablet Take 25 mg by mouth       loratadine (CLARITIN REDITABS) 10 MG ODT Take 10 mg by mouth daily        losartan (COZAAR) 50 MG tablet Take 50 mg by mouth daily        Melatonin 10 MG TABS tablet Take 10 mg by mouth nightly as needed for sleep       metoprolol succinate ER (TOPROL-XL) 50 MG 24 hr tablet Take 50 mg by mouth daily        multivitamin (CENTRUM SILVER) tablet Take 1 tablet by mouth daily        nitroGLYcerin (NITROSTAT) 0.4 MG sublingual tablet Place 0.4 mg under the tongue every 5 minutes as needed for chest pain For chest pain place 1 tablet under the tongue every 5 minutes for 3 doses. If symptoms persist 5 minutes after 1st dose call 911.       Omega-3 Fatty Acids (OMEGA-3 FISH OIL) 300 MG CAPS        omeprazole 20 MG tablet Take 20 mg by mouth daily        predniSONE (DELTASONE) 10 MG tablet Take 6 PO daily x 3 days, then taper by 1 pill every day.       rosuvastatin (CRESTOR) 40 MG " tablet Take 40 mg by mouth daily        nitroFURantoin macrocrystal-monohydrate (MACROBID) 100 MG capsule Take 1 capsule (100 mg) by mouth 2 times daily 14 capsule 0     senna-docusate (SENOKOT-S/PERICOLACE) 8.6-50 MG tablet Take 1-2 tablets by mouth 2 times daily Take while on oral narcotics to prevent or treat constipation. 30 tablet 0     tamsulosin (FLOMAX) 0.4 MG capsule Take 0.4 mg by mouth daily (Patient not taking: Reported on 2022)         Social History     Tobacco Use     Smoking status: Never Smoker     Smokeless tobacco: Never Used   Substance Use Topics     Alcohol use: Never     Drug use: Never       Invasive Procedure Safety Checklist:    Procedure: Cystoscopy    Action: Complete sections and checkboxes as appropriate.    Pre-procedure:  1. Patient ID Verified with 2 identifiers (Mesha and  or MRN) : YES    2. Procedure and site verified with patient/designee (when able) : YES    3. Accurate consent documentation in medical record : YES    4. H&P (or appropriate assessment) documented in medical record : N/A  H&P must be up to 30 days prior to procedure an updated within 24 hours of                 Procedure as applicable.     5. Relevant diagnostic and radiology test results appropriately labeled and displayed as applicable : YES    6. Blood products, implants, devices, and/or special equipment available for the procedure as applicable : YES    7. Procedure site(s) marked with provider initials [Exclusions: none] : NO    8. Marking not required. Reason : Yes  Procedure does not require site marking    Time Out:     Time-Out performed immediately prior to starting procedure, including verbal and active participation of all team members addressing: YES    1. Correct patient identity.  2. Confirmed that the correct side and site are marked.  3. An accurate procedure to be done.  4. Agreement on the procedure to be done.  5. Correct patient position.  6. Relevant images and results are properly  labeled and appropriately displayed.  7. The need to administer antibiotics or fluids for irrigation purposes during the procedure as applicable.  8. Safety precautions based on patient history or medication use.    During Procedure: Verification of correct person, site, and procedure occurs any time the responsibility for care of the patient is transferred to another member of the care team.    The following medication was given:     MEDICATION:  Lidocaine without epinephrine 2% jelly  ROUTE: urethral   SITE: urethral   DOSE: 10 mL  LOT #: [id number]  : [employer]  EXPIRATION DATE: 9-23  NDC#: [address]-6   Was there drug waste? No    Prior to med admin, verified patient identity using patient's name and date of birth.  Due to med administration, patient instructed to remain in clinic for 15 minutes  afterwards, and to report any adverse reaction to me immediately.    Drug Amount Wasted:  None.  Vial/Syringe: Isis Nichols, EMT  4/5/2022  1:56 PM

## 2022-04-05 NOTE — NURSING NOTE
"Chief Complaint   Patient presents with     Cystoscopy     Slowing of urinary stream       Blood pressure 124/78, pulse 69, height 1.778 m (5' 10\"), weight 93.4 kg (206 lb). Body mass index is 29.56 kg/m .    Patient Active Problem List   Diagnosis     Hard to intubate     Enlarged prostate       Allergies   Allergen Reactions     Isosorbide      Other reaction(s): Headache     Lisinopril Cough       Current Outpatient Medications   Medication Sig Dispense Refill     acetaminophen (TYLENOL) 325 MG tablet Take 2 tablets (650 mg) by mouth every 4 hours as needed for other (mild pain) 100 tablet 0     amLODIPine (NORVASC) 5 MG tablet Take 5 mg by mouth daily       Apoaequorin (PREVAGEN) 10 MG CAPS Take 1 tablet by mouth daily        aspirin (ASTON ASPIRIN EC LOW DOSE) 81 MG EC tablet Take 81 mg by mouth daily        cholecalciferol 50 MCG (2000 UT) tablet Take 1 tablet by mouth daily        Co-Enzyme Q-10 15 MG TABS        coenzyme Q-10 200 MG CAPS Take 200 mg by mouth daily       hydrochlorothiazide (HYDRODIURIL) 25 MG tablet Take 25 mg by mouth       loratadine (CLARITIN REDITABS) 10 MG ODT Take 10 mg by mouth daily        losartan (COZAAR) 50 MG tablet Take 50 mg by mouth daily        Melatonin 10 MG TABS tablet Take 10 mg by mouth nightly as needed for sleep       metoprolol succinate ER (TOPROL-XL) 50 MG 24 hr tablet Take 50 mg by mouth daily        multivitamin (CENTRUM SILVER) tablet Take 1 tablet by mouth daily        nitroGLYcerin (NITROSTAT) 0.4 MG sublingual tablet Place 0.4 mg under the tongue every 5 minutes as needed for chest pain For chest pain place 1 tablet under the tongue every 5 minutes for 3 doses. If symptoms persist 5 minutes after 1st dose call 911.       Omega-3 Fatty Acids (OMEGA-3 FISH OIL) 300 MG CAPS        omeprazole 20 MG tablet Take 20 mg by mouth daily        predniSONE (DELTASONE) 10 MG tablet Take 6 PO daily x 3 days, then taper by 1 pill every day.       rosuvastatin (CRESTOR) 40 MG " tablet Take 40 mg by mouth daily        nitroFURantoin macrocrystal-monohydrate (MACROBID) 100 MG capsule Take 1 capsule (100 mg) by mouth 2 times daily 14 capsule 0     senna-docusate (SENOKOT-S/PERICOLACE) 8.6-50 MG tablet Take 1-2 tablets by mouth 2 times daily Take while on oral narcotics to prevent or treat constipation. 30 tablet 0     tamsulosin (FLOMAX) 0.4 MG capsule Take 0.4 mg by mouth daily (Patient not taking: Reported on 2022)         Social History     Tobacco Use     Smoking status: Never Smoker     Smokeless tobacco: Never Used   Substance Use Topics     Alcohol use: Never     Drug use: Never       Nanci Granger CMA  2022  1:19 PM     Invasive Procedure Safety Checklist:    Procedure: Cystoscopy    Action: Complete sections and checkboxes as appropriate.    Pre-procedure:  1. Patient ID Verified with 2 identifiers (Mesha and  or MRN) : YES    2. Procedure and site verified with patient/designee (when able) : YES    3. Accurate consent documentation in medical record : YES    4. H&P (or appropriate assessment) documented in medical record : YES  H&P must be up to 30 days prior to procedure an updated within 24 hours of                 Procedure as applicable.     5. Relevant diagnostic and radiology test results appropriately labeled and displayed as applicable : YES    6. Blood products, implants, devices, and/or special equipment available for the procedure as applicable : YES    7. Procedure site(s) marked with provider initials [Exclusions: None] : NO    8. Marking not required. Reason : Yes  Procedure does not require site marking    Time Out:     Time-Out performed immediately prior to starting procedure, including verbal and active participation of all team members addressing: YES    1. Correct patient identity.  2. Confirmed that the correct side and site are marked.  3. An accurate procedure to be done.  4. Agreement on the procedure to be done.  5. Correct patient  position.  6. Relevant images and results are properly labeled and appropriately displayed.  7. The need to administer antibiotics or fluids for irrigation purposes during the procedure as applicable.  8. Safety precautions based on patient history or medication use.    During Procedure: Verification of correct person, site, and procedure occurs any time the responsibility for care of the patient is transferred to another member of the care team.    No medications administered during this procedure.    Nanci Granger CMA  April 5, 2022

## 2022-04-08 NOTE — PROGRESS NOTES
CYSTOSCOPY PROCEDURE NOTE    Reason for cystoscopy: Slow stream    Brief History: 67 yo M with hx of HoLEP 2/4/22, initially voiding well but with slowing stream since. 53 gm benign tissue removed.    CYSTOSCOPY  After obtaining informed consent, the patient was prepped and draped in the standard sterile fashion.  The 15 [place] flexible cystoscope was inserted through the urethral meatus.      The anterior urethra was:  normal without stricture.    The external sphincter was  appropriately coapted. There was a stricture at the backside of the external sphincter that was flimsy and easily bypassed/broken with the cystoscopy.  The holep fossa was otherwise widely patent and the bladder without pathology.  The bladder neck was open.    We passed a 20F taylor easily once removing the cystoscope    The patient tolerated the procedure well without complication.      Uroflow after cysto  ump uro prostate review 4/5/2022   Peak Flow 29.4   Average Flow 14.6   Residual Volume by Ultrasound 28   Character of Curve Can not be characterized     Assessment/Plan: 67 yo M with urethral stricture post holep, dilated in office  -RTC for UF/PVR in 4 weeks  -If symptoms recur will plan for formal DVIU in OR    I, Francisco Javier Jhaveri saw and evaluated this patient and agree with the plan as stated above.  I personally performed all listed procedures.

## 2022-04-15 ENCOUNTER — PRE VISIT (OUTPATIENT)
Dept: UROLOGY | Facility: CLINIC | Age: 67
End: 2022-04-15
Payer: COMMERCIAL

## 2022-04-15 NOTE — TELEPHONE ENCOUNTER
Reason for visit: follow up      Dx/Hx/Sx: slow urinary stream     Records/imaging/labs/orders: in epic    At Rooming: Flow/PVR

## 2022-05-11 ENCOUNTER — TELEPHONE (OUTPATIENT)
Dept: UROLOGY | Facility: CLINIC | Age: 67
End: 2022-05-11
Payer: COMMERCIAL

## 2022-05-11 NOTE — TELEPHONE ENCOUNTER
LVM for patient to cancel appointment on 5-31 and scheduled on 6-7 at 3:30. Ok to use [facility] per provider.     YOUR NEXT PAIN MEDICATION IS DUE AT______________      General Anesthesia, Pediatric, Care After  Refer to this sheet in the next few weeks. These instructions provide you with information on caring for your child after his or her procedure. Your child's health care provider may also give you more specific instructions. Your child's treatment has been planned according to current medical practices, but problems sometimes occur. Call your child's health care provider if there are any problems or you have questions after the procedure.  WHAT TO EXPECT AFTER THE PROCEDURE    After the procedure, it is typical for your child to have the following:  · Restlessness.  · Agitation.  · Sleepiness.  HOME CARE INSTRUCTIONS  · Watch your child carefully. It is helpful to have a second adult with you to monitor your child on the drive home.  · Do not leave your child unattended in a car seat. If the child falls asleep in a car seat, make sure his or her head remains upright. Do not turn to look at your child while driving. If driving alone, make frequent stops to check your child's breathing.  · Do not leave your child alone when he or she is sleeping. Check on your child often to make sure breathing is normal.  · Gently place your child's head to the side if your child falls asleep in a different position. This helps keep the airway clear if vomiting occurs.  · Calm and reassure your child if he or she is upset. Restlessness and agitation can be side effects of the procedure and should not last more than 3 hours.  · Only give your child's usual medicines or new medicines if your child's health care provider approves them.  · Keep all follow-up appointments as directed by your child's health care provider.  If your child is less than 1 year old:  · Your infant may have trouble holding up his or her head. Gently position your infant's head so that it does not rest on the chest. This will help your infant breathe.  · Help your  infant crawl or walk.  · Make sure your infant is awake and alert before feeding. Do not force your infant to feed.  · You may feed your infant breast milk or formula 1 hour after being discharged from the hospital. Only give your infant half of what he or she regularly drinks for the first feeding.  · If your infant throws up (vomits) right after feeding, feed for shorter periods of time more often. Try offering the breast or bottle for 5 minutes every 30 minutes.  · Burp your infant after feeding. Keep your infant sitting for 10-15 minutes. Then, lay your infant on the stomach or side.  · Your infant should have a wet diaper every 4-6 hours.  If your child is over 1 year old:  · Supervise all play and bathing.  · Help your child stand, walk, and climb stairs.  · Your child should not ride a bicycle, skate, use swing sets, climb, swim, use machines, or participate in any activity where he or she could become injured.  · Wait 2 hours after discharge from the hospital before feeding your child. Start with clear liquids, such as water or clear juice. Your child should drink slowly and in small quantities. After 30 minutes, your child may have formula. If your child eats solid foods, give him or her foods that are soft and easy to chew.  · Only feed your child if he or she is awake and alert and does not feel sick to the stomach (nauseous). Do not worry if your child does not want to eat right away, but make sure your child is drinking enough to keep urine clear or pale yellow.  · If your child vomits, wait 1 hour. Then, start again with clear liquids.  SEEK IMMEDIATE MEDICAL CARE IF:    · Your child is not behaving normally after 24 hours.  · Your child has difficulty waking up or cannot be woken up.  · Your child will not drink.  · Your child vomits 3 or more times or cannot stop vomiting.  · Your child has trouble breathing or speaking.  · Your child's skin between the ribs gets sucked in when he or she breathes in  (chest retractions).  · Your child has blue or gray skin.  · Your child cannot be calmed down for at least a few minutes each hour.  · Your child has heavy bleeding, redness, or a lot of swelling where the anesthetic entered the skin (IV site).  · Your child has a rash.     This information is not intended to replace advice given to you by your health care provider. Make sure you discuss any questions you have with your health care provider.     Document Released: 10/08/2014 Document Reviewed: 10/08/2014  [employer] Interactive Patient Education ©2016 [employer]         CALL YOUR CHILD'S  PHYSICIAN IF YOUR CHILD EXPERIENCES  INCREASED PAIN NOT HELPED BY YOUR CHILD'S PAIN MEDICATION         Fall Prevention in the Home      Falls can cause injuries. They can happen to people of all ages. There are many things you can do to make your home safe and to help prevent falls.    WHAT CAN I DO ON THE OUTSIDE OF MY HOME?  · Regularly fix the edges of walkways and driveways and fix any cracks.  · Remove anything that might make you trip as you walk through a door, such as a raised step or threshold.  · Trim any bushes or trees on the path to your home.  · Use bright outdoor lighting.  · Clear any walking paths of anything that might make someone trip, such as rocks or tools.  · Regularly check to see if handrails are loose or broken. Make sure that both sides of any steps have handrails.  · Any raised decks and porches should have guardrails on the edges.  · Have any leaves, snow, or ice cleared regularly.  · Use sand or salt on walking paths during winter.  · Clean up any spills in your garage right away. This includes oil or grease spills.  WHAT CAN I DO IN THE BATHROOM?    · Use night lights.  · Install grab bars by the toilet and in the tub and shower. Do not use towel bars as grab bars.  · Use non-skid mats or decals in the tub or shower.  · If you need to sit down in the shower, use a plastic, non-slip stool.  · Keep the  floor dry. Clean up any water that spills on the floor as soon as it happens.  · Remove soap buildup in the tub or shower regularly.  · Attach bath mats securely with double-sided non-slip rug tape.  · Do not have throw rugs and other things on the floor that can make you trip.  WHAT CAN I DO IN THE BEDROOM?  · Use night lights.  · Make sure that you have a light by your bed that is easy to reach.  · Do not use any sheets or blankets that are too big for your bed. They should not hang down onto the floor.  · Have a firm chair that has side arms. You can use this for support while you get dressed.  · Do not have throw rugs and other things on the floor that can make you trip.  WHAT CAN I DO IN THE KITCHEN?  · Clean up any spills right away.  · Avoid walking on wet floors.  · Keep items that you use a lot in easy-to-reach places.  · If you need to reach something above you, use a strong step stool that has a grab bar.  · Keep electrical cords out of the way.  · Do not use floor polish or wax that makes floors slippery. If you must use wax, use non-skid floor wax.  · Do not have throw rugs and other things on the floor that can make you trip.  WHAT CAN I DO WITH MY STAIRS?  · Do not leave any items on the stairs.  · Make sure that there are handrails on both sides of the stairs and use them. Fix handrails that are broken or loose. Make sure that handrails are as long as the stairways.  · Check any carpeting to make sure that it is firmly attached to the stairs. Fix any carpet that is loose or worn.  · Avoid having throw rugs at the top or bottom of the stairs. If you do have throw rugs, attach them to the floor with carpet tape.  · Make sure that you have a light switch at the top of the stairs and the bottom of the stairs. If you do not have them, ask someone to add them for you.  WHAT ELSE CAN I DO TO HELP PREVENT FALLS?  · Wear shoes that:  ¨ Do not have high heels.  ¨ Have rubber bottoms.  ¨ Are comfortable and fit  you well.  ¨ Are closed at the toe. Do not wear sandals.  · If you use a stepladder:  ¨ Make sure that it is fully opened. Do not climb a closed stepladder.  ¨ Make sure that both sides of the stepladder are locked into place.  ¨ Ask someone to hold it for you, if possible.  · Clearly arielle and make sure that you can see:  ¨ Any grab bars or handrails.  ¨ First and last steps.  ¨ Where the edge of each step is.  · Use tools that help you move around (mobility aids) if they are needed. These include:  ¨ Canes.  ¨ Walkers.  ¨ Scooters.  ¨ Crutches.  · Turn on the lights when you go into a dark area. Replace any light bulbs as soon as they burn out.  · Set up your furniture so you have a clear path. Avoid moving your furniture around.  · If any of your floors are uneven, fix them.  · If there are any pets around you, be aware of where they are.  · Review your medicines with your doctor. Some medicines can make you feel dizzy. This can increase your chance of falling.  Ask your doctor what other things that you can do to help prevent falls.     This information is not intended to replace advice given to you by your health care provider. Make sure you discuss any questions you have with your health care provider.     Document Released: 10/14/2010 Document Revised: 05/03/2016 Document Reviewed: 01/22/2016  [employer] Interactive Patient Education ©2016 [employer]

## 2022-05-16 ENCOUNTER — TELEPHONE (OUTPATIENT)
Dept: UROLOGY | Facility: CLINIC | Age: 67
End: 2022-05-16
Payer: COMMERCIAL

## 2022-05-16 NOTE — TELEPHONE ENCOUNTER
LVM for patient to reschedule appointment on 5-31 to 6-7 at  3:30. Okay to use [facility] spot per provider.

## 2022-10-03 ENCOUNTER — HEALTH MAINTENANCE LETTER (OUTPATIENT)
Age: 67
End: 2022-10-03

## 2023-02-11 ENCOUNTER — HEALTH MAINTENANCE LETTER (OUTPATIENT)
Age: 68
End: 2023-02-11

## 2024-03-09 ENCOUNTER — HEALTH MAINTENANCE LETTER (OUTPATIENT)
Age: 69
End: 2024-03-09

## 2025-03-16 ENCOUNTER — HEALTH MAINTENANCE LETTER (OUTPATIENT)
Age: 70
End: 2025-03-16

## (undated) DEVICE — SOL WATER IRRIG 1000ML BOTTLE 2F7114

## (undated) DEVICE — LASER FIBER HOLMIUM MOSES 550 D/F/L AC-10030120

## (undated) DEVICE — Device

## (undated) DEVICE — SOL NACL 0.9% IRRIG 1000ML BOTTLE 2F7124

## (undated) DEVICE — SPECIMEN TRAP TISSUE CONTAINER PIRANHA 2208120

## (undated) DEVICE — BAG URINARY DRAIN 4000ML LF 153509

## (undated) DEVICE — BLADE MORCELLATOR WOLF PIRANHA 4.75X385MM 49700103

## (undated) DEVICE — LINEN GOWN X4 5410

## (undated) DEVICE — SUCTION MANIFOLD NEPTUNE 2 SYS 4 PORT 0702-020-000

## (undated) DEVICE — TUBING SET THERMEDX UROLOGY SGL USE LL0006

## (undated) DEVICE — STRAP KNEE/BODY 31143004

## (undated) DEVICE — LINEN TOWEL PACK X5 5464

## (undated) DEVICE — DRSG ABDOMINAL 07 1/2X8" 7197D

## (undated) DEVICE — SYR PISTON IRRIGATION 60 ML DYND20325

## (undated) DEVICE — DRAPE MAYO STAND 23X54 8337

## (undated) DEVICE — SYR 50ML LL W/O NDL 309653

## (undated) DEVICE — CATH FOLEY 3WAY 22FR 30ML SIL 73022SI

## (undated) DEVICE — GLOVE PROTEXIS W/NEU-THERA 7.5  2D73TE75

## (undated) DEVICE — SOL NACL 0.9% IRRIG 3000ML BAG 2B7477

## (undated) DEVICE — DRSG GAUZE 4X8" NON21842

## (undated) DEVICE — FILTER PIRANHA DISP 2228.901

## (undated) DEVICE — PAD CHUX UNDERPAD 30X36" P3036C

## (undated) DEVICE — TUBING SUCTION MEDI-VAC 1/4"X20' N620A

## (undated) DEVICE — TUBING SET PIRANHA 41702208

## (undated) DEVICE — SUCTION WATERBUG FLOOR PUDDLE VAC 9321

## (undated) DEVICE — CATH LASER URETERAL 7.1FRX40CM G17797  022403-7.1-40

## (undated) RX ORDER — PROPOFOL 10 MG/ML
INJECTION, EMULSION INTRAVENOUS
Status: DISPENSED
Start: 2022-02-04

## (undated) RX ORDER — DEXAMETHASONE SODIUM PHOSPHATE 4 MG/ML
INJECTION, SOLUTION INTRA-ARTICULAR; INTRALESIONAL; INTRAMUSCULAR; INTRAVENOUS; SOFT TISSUE
Status: DISPENSED
Start: 2022-02-04

## (undated) RX ORDER — ONDANSETRON 2 MG/ML
INJECTION INTRAMUSCULAR; INTRAVENOUS
Status: DISPENSED
Start: 2022-02-04

## (undated) RX ORDER — FENTANYL CITRATE 50 UG/ML
INJECTION, SOLUTION INTRAMUSCULAR; INTRAVENOUS
Status: DISPENSED
Start: 2022-02-04

## (undated) RX ORDER — LIDOCAINE HYDROCHLORIDE 20 MG/ML
INJECTION, SOLUTION EPIDURAL; INFILTRATION; INTRACAUDAL; PERINEURAL
Status: DISPENSED
Start: 2022-02-04

## (undated) RX ORDER — ATROPA BELLADONNA AND OPIUM 16.2; 3 MG/1; MG/1
SUPPOSITORY RECTAL
Status: DISPENSED
Start: 2022-02-04

## (undated) RX ORDER — LIDOCAINE HYDROCHLORIDE 20 MG/ML
JELLY TOPICAL
Status: DISPENSED
Start: 2022-04-05

## (undated) RX ORDER — CEFAZOLIN SODIUM 2 G/100ML
INJECTION, SOLUTION INTRAVENOUS
Status: DISPENSED
Start: 2022-02-04

## (undated) RX ORDER — LIDOCAINE HYDROCHLORIDE 20 MG/ML
JELLY TOPICAL
Status: DISPENSED
Start: 2022-02-04

## (undated) RX ORDER — EPHEDRINE SULFATE 50 MG/ML
INJECTION, SOLUTION INTRAMUSCULAR; INTRAVENOUS; SUBCUTANEOUS
Status: DISPENSED
Start: 2022-02-04

## (undated) RX ORDER — OXYCODONE HYDROCHLORIDE 5 MG/1
TABLET ORAL
Status: DISPENSED
Start: 2022-02-04